# Patient Record
Sex: MALE | Race: WHITE | Employment: OTHER | URBAN - METROPOLITAN AREA
[De-identification: names, ages, dates, MRNs, and addresses within clinical notes are randomized per-mention and may not be internally consistent; named-entity substitution may affect disease eponyms.]

---

## 2017-09-05 ENCOUNTER — HOSPITAL ENCOUNTER (OUTPATIENT)
Dept: LAB | Age: 47
Discharge: HOME | End: 2017-09-05
Attending: FAMILY MEDICINE | Admitting: FAMILY MEDICINE

## 2017-09-05 LAB
ALBUMIN SERPL-MCNC: 4.1 GM/DL (ref 3.4–5)
ALP BLD-CCNC: 68 IU/L (ref 40–128)
ALT SERPL-CCNC: 17 U/L (ref 10–40)
ANION GAP SERPL CALCULATED.3IONS-SCNC: 9 MMOL/L (ref 4–16)
AST SERPL-CCNC: 16 IU/L (ref 15–37)
BACTERIA: NEGATIVE /HPF
BILIRUB SERPL-MCNC: 0.3 MG/DL (ref 0–1)
BILIRUBIN URINE: ABNORMAL MG/DL
BLOOD, URINE: NEGATIVE
BUN BLDV-MCNC: 25 MG/DL (ref 6–23)
CALCIUM SERPL-MCNC: 8.5 MG/DL (ref 8.3–10.6)
CHLORIDE BLD-SCNC: 103 MMOL/L (ref 99–110)
CHOLESTEROL: 188 MG/DL
CLARITY: CLEAR
CO2: 28 MMOL/L (ref 21–32)
COLOR: ABNORMAL
CREAT SERPL-MCNC: 1 MG/DL (ref 0.9–1.3)
ESTIMATED AVERAGE GLUCOSE: 97 MG/DL
GFR AFRICAN AMERICAN: >60 ML/MIN/1.73M2
GFR NON-AFRICAN AMERICAN: >60 ML/MIN/1.73M2
GLUCOSE FASTING: 90 MG/DL (ref 70–99)
GLUCOSE, URINE: NEGATIVE MG/DL
HBA1C MFR BLD: 5 % (ref 4.2–6.3)
HCT VFR BLD CALC: 44.8 % (ref 42–52)
HDLC SERPL-MCNC: 30 MG/DL
HEMOGLOBIN: 14.9 GM/DL (ref 13.5–18)
ICTOTEST: NEGATIVE
KETONES, URINE: ABNORMAL MG/DL
LDL CHOLESTEROL CALCULATED: 118 MG/DL
LEUKOCYTE ESTERASE, URINE: NEGATIVE
MCH RBC QN AUTO: 33.2 PG (ref 27–31)
MCHC RBC AUTO-ENTMCNC: 33.3 % (ref 32–36)
MCV RBC AUTO: 99.8 FL (ref 78–100)
MUCUS: ABNORMAL HPF
NITRITE URINE, QUANTITATIVE: NEGATIVE
PDW BLD-RTO: 12.7 % (ref 11.7–14.9)
PH, URINE: 7 (ref 5–8)
PLATELET # BLD: 165 K/CU MM (ref 140–440)
PMV BLD AUTO: 9.8 FL (ref 7.5–11.1)
POTASSIUM SERPL-SCNC: 5.1 MMOL/L (ref 3.5–5.1)
PROTEIN UA: 100 MG/DL
RBC # BLD: 4.49 M/CU MM (ref 4.6–6.2)
RBC URINE: ABNORMAL /HPF (ref 0–3)
SODIUM BLD-SCNC: 140 MMOL/L (ref 135–145)
SPECIFIC GRAVITY UA: 1.04 (ref 1–1.03)
TOTAL PROTEIN: 6.9 GM/DL (ref 6.4–8.2)
TRICHOMONAS: ABNORMAL /HPF
TRIGL SERPL-MCNC: 199 MG/DL
TSH HIGH SENSITIVITY: 1.73 UIU/ML (ref 0.27–4.2)
UROBILINOGEN, URINE: 2 MG/DL (ref 0.2–1)
WBC # BLD: 6.7 K/CU MM (ref 4–10.5)
WBC UA: <1 /HPF (ref 0–2)

## 2018-12-07 ENCOUNTER — HOSPITAL ENCOUNTER (OUTPATIENT)
Age: 48
Discharge: HOME | End: 2018-12-07
Payer: MEDICARE

## 2018-12-07 LAB
ALBUMIN SERPL-MCNC: 4.2 GM/DL (ref 3.4–5)
ALP BLD-CCNC: 67 IU/L (ref 40–128)
ALT SERPL-CCNC: 16 U/L (ref 10–40)
ANION GAP SERPL CALCULATED.3IONS-SCNC: 8 MMOL/L (ref 4–16)
AST SERPL-CCNC: 15 IU/L (ref 15–37)
BILIRUB SERPL-MCNC: 0.4 MG/DL (ref 0–1)
BUN BLDV-MCNC: 20 MG/DL (ref 6–23)
CALCIUM SERPL-MCNC: 9 MG/DL (ref 8.3–10.6)
CHLORIDE BLD-SCNC: 98 MMOL/L (ref 99–110)
CHOLESTEROL: 243 MG/DL
CO2: 33 MMOL/L (ref 21–32)
CREAT SERPL-MCNC: 1.1 MG/DL (ref 0.9–1.3)
ESTIMATED AVERAGE GLUCOSE: 103 MG/DL
GFR AFRICAN AMERICAN: >60 ML/MIN/1.73M2
GFR NON-AFRICAN AMERICAN: >60 ML/MIN/1.73M2
GLUCOSE BLD-MCNC: 98 MG/DL (ref 70–99)
HBA1C MFR BLD: 5.2 % (ref 4.2–6.3)
HCT VFR BLD CALC: 44.1 % (ref 42–52)
HDLC SERPL-MCNC: 29 MG/DL
HEMOGLOBIN: 14.8 GM/DL (ref 13.5–18)
LDL CHOLESTEROL DIRECT: 172 MG/DL
MCH RBC QN AUTO: 31.9 PG (ref 27–31)
MCHC RBC AUTO-ENTMCNC: 33.6 % (ref 32–36)
MCV RBC AUTO: 95 FL (ref 78–100)
PDW BLD-RTO: 12.7 % (ref 11.7–14.9)
PLATELET # BLD: 183 K/CU MM (ref 140–440)
PMV BLD AUTO: 10.1 FL (ref 7.5–11.1)
POTASSIUM SERPL-SCNC: 4.4 MMOL/L (ref 3.5–5.1)
RBC # BLD: 4.64 M/CU MM (ref 4.6–6.2)
SODIUM BLD-SCNC: 139 MMOL/L (ref 135–145)
T4 FREE: 1.1 NG/DL (ref 0.9–1.8)
TOTAL PROTEIN: 6.6 GM/DL (ref 6.4–8.2)
TRIGL SERPL-MCNC: 351 MG/DL
TSH HIGH SENSITIVITY: 2.7 UIU/ML (ref 0.27–4.2)
VALPROIC ACID LEVEL: 81.8 UG/ML (ref 50–100)
WBC # BLD: 5.7 K/CU MM (ref 4–10.5)

## 2018-12-07 PROCEDURE — 80053 COMPREHEN METABOLIC PANEL: CPT

## 2018-12-07 PROCEDURE — 83721 ASSAY OF BLOOD LIPOPROTEIN: CPT

## 2018-12-07 PROCEDURE — 80164 ASSAY DIPROPYLACETIC ACD TOT: CPT

## 2018-12-07 PROCEDURE — 84443 ASSAY THYROID STIM HORMONE: CPT

## 2018-12-07 PROCEDURE — 36415 COLL VENOUS BLD VENIPUNCTURE: CPT

## 2018-12-07 PROCEDURE — 84439 ASSAY OF FREE THYROXINE: CPT

## 2018-12-07 PROCEDURE — 85027 COMPLETE CBC AUTOMATED: CPT

## 2018-12-07 PROCEDURE — 80061 LIPID PANEL: CPT

## 2018-12-07 PROCEDURE — 83036 HEMOGLOBIN GLYCOSYLATED A1C: CPT

## 2019-03-22 ENCOUNTER — HOSPITAL ENCOUNTER (OUTPATIENT)
Age: 49
Discharge: HOME | End: 2019-03-22
Payer: MEDICARE

## 2019-03-22 ENCOUNTER — HOSPITAL ENCOUNTER (OUTPATIENT)
Dept: GENERAL RADIOLOGY | Age: 49
Discharge: HOME | End: 2019-03-22
Payer: MEDICARE

## 2019-03-22 ENCOUNTER — HOSPITAL ENCOUNTER (OUTPATIENT)
Dept: ULTRASOUND IMAGING | Age: 49
Discharge: HOME | End: 2019-03-22
Payer: MEDICARE

## 2019-03-22 DIAGNOSIS — M25.562 ACUTE PAIN OF LEFT KNEE: ICD-10-CM

## 2019-03-22 DIAGNOSIS — R60.0 LEG EDEMA: ICD-10-CM

## 2019-03-22 DIAGNOSIS — M25.562 LEFT KNEE PAIN, UNSPECIFIED CHRONICITY: ICD-10-CM

## 2019-03-22 PROCEDURE — 73560 X-RAY EXAM OF KNEE 1 OR 2: CPT

## 2019-03-22 PROCEDURE — 93971 EXTREMITY STUDY: CPT

## 2019-06-23 ENCOUNTER — HOSPITAL ENCOUNTER (INPATIENT)
Age: 49
LOS: 3 days | Discharge: HOME | DRG: 300 | End: 2019-06-26
Attending: EMERGENCY MEDICINE | Admitting: INTERNAL MEDICINE
Payer: MEDICARE

## 2019-06-23 ENCOUNTER — APPOINTMENT (OUTPATIENT)
Dept: ULTRASOUND IMAGING | Age: 49
DRG: 300 | End: 2019-06-23
Payer: MEDICARE

## 2019-06-23 ENCOUNTER — APPOINTMENT (OUTPATIENT)
Dept: GENERAL RADIOLOGY | Age: 49
DRG: 300 | End: 2019-06-23
Payer: MEDICARE

## 2019-06-23 DIAGNOSIS — M54.5 LOW BACK PAIN, UNSPECIFIED BACK PAIN LATERALITY, UNSPECIFIED CHRONICITY, WITH SCIATICA PRESENCE UNSPECIFIED: ICD-10-CM

## 2019-06-23 DIAGNOSIS — I82.412 ACUTE DEEP VEIN THROMBOSIS (DVT) OF FEMORAL VEIN OF LEFT LOWER EXTREMITY (HCC): Primary | ICD-10-CM

## 2019-06-23 DIAGNOSIS — I73.9 PAD (PERIPHERAL ARTERY DISEASE) (HCC): ICD-10-CM

## 2019-06-23 LAB
ALBUMIN SERPL-MCNC: 3.6 GM/DL (ref 3.4–5)
ALP BLD-CCNC: 71 IU/L (ref 40–129)
ALT SERPL-CCNC: 11 U/L (ref 10–40)
ANION GAP SERPL CALCULATED.3IONS-SCNC: 10 MMOL/L (ref 4–16)
APTT: 28.7 SECONDS (ref 21.2–33)
APTT: 29.5 SECONDS (ref 21.2–33)
AST SERPL-CCNC: 10 IU/L (ref 15–37)
BASOPHILS ABSOLUTE: 0.1 K/CU MM
BASOPHILS RELATIVE PERCENT: 1 % (ref 0–1)
BILIRUB SERPL-MCNC: 0.6 MG/DL (ref 0–1)
BUN BLDV-MCNC: 13 MG/DL (ref 6–23)
CALCIUM SERPL-MCNC: 8.9 MG/DL (ref 8.3–10.6)
CHLORIDE BLD-SCNC: 102 MMOL/L (ref 99–110)
CO2: 27 MMOL/L (ref 21–32)
CREAT SERPL-MCNC: 1 MG/DL (ref 0.9–1.3)
DIFFERENTIAL TYPE: ABNORMAL
EOSINOPHILS ABSOLUTE: 0.3 K/CU MM
EOSINOPHILS RELATIVE PERCENT: 3.4 % (ref 0–3)
GFR AFRICAN AMERICAN: >60 ML/MIN/1.73M2
GFR NON-AFRICAN AMERICAN: >60 ML/MIN/1.73M2
GLUCOSE BLD-MCNC: 92 MG/DL (ref 70–99)
HCT VFR BLD CALC: 39 % (ref 42–52)
HEMOGLOBIN: 13 GM/DL (ref 13.5–18)
IMMATURE NEUTROPHIL %: 0.4 % (ref 0–0.43)
INR BLD: 1.14 INDEX
LYMPHOCYTES ABSOLUTE: 1.5 K/CU MM
LYMPHOCYTES RELATIVE PERCENT: 18.7 % (ref 24–44)
MCH RBC QN AUTO: 31.8 PG (ref 27–31)
MCHC RBC AUTO-ENTMCNC: 33.3 % (ref 32–36)
MCV RBC AUTO: 95.4 FL (ref 78–100)
MONOCYTES ABSOLUTE: 0.7 K/CU MM
MONOCYTES RELATIVE PERCENT: 8.9 % (ref 0–4)
NUCLEATED RBC %: 0 %
PDW BLD-RTO: 12.4 % (ref 11.7–14.9)
PLATELET # BLD: 161 K/CU MM (ref 140–440)
PMV BLD AUTO: 9.3 FL (ref 7.5–11.1)
POTASSIUM SERPL-SCNC: 4.4 MMOL/L (ref 3.5–5.1)
PRO-BNP: 94.58 PG/ML
PROTHROMBIN TIME: 13.2 SECONDS (ref 9.12–12.5)
RBC # BLD: 4.09 M/CU MM (ref 4.6–6.2)
SEGMENTED NEUTROPHILS ABSOLUTE COUNT: 5.3 K/CU MM
SEGMENTED NEUTROPHILS RELATIVE PERCENT: 67.6 % (ref 36–66)
SODIUM BLD-SCNC: 139 MMOL/L (ref 135–145)
TOTAL CK: 71 IU/L (ref 38–174)
TOTAL IMMATURE NEUTOROPHIL: 0.03 K/CU MM
TOTAL NUCLEATED RBC: 0 K/CU MM
TOTAL PROTEIN: 7 GM/DL (ref 6.4–8.2)
WBC # BLD: 7.9 K/CU MM (ref 4–10.5)

## 2019-06-23 PROCEDURE — 6360000002 HC RX W HCPCS: Performed by: INTERNAL MEDICINE

## 2019-06-23 PROCEDURE — 85610 PROTHROMBIN TIME: CPT

## 2019-06-23 PROCEDURE — 36415 COLL VENOUS BLD VENIPUNCTURE: CPT

## 2019-06-23 PROCEDURE — 85730 THROMBOPLASTIN TIME PARTIAL: CPT

## 2019-06-23 PROCEDURE — 83880 ASSAY OF NATRIURETIC PEPTIDE: CPT

## 2019-06-23 PROCEDURE — 94761 N-INVAS EAR/PLS OXIMETRY MLT: CPT

## 2019-06-23 PROCEDURE — 93971 EXTREMITY STUDY: CPT

## 2019-06-23 PROCEDURE — 99284 EMERGENCY DEPT VISIT MOD MDM: CPT

## 2019-06-23 PROCEDURE — 6370000000 HC RX 637 (ALT 250 FOR IP): Performed by: INTERNAL MEDICINE

## 2019-06-23 PROCEDURE — 93005 ELECTROCARDIOGRAM TRACING: CPT | Performed by: PHYSICIAN ASSISTANT

## 2019-06-23 PROCEDURE — 80053 COMPREHEN METABOLIC PANEL: CPT

## 2019-06-23 PROCEDURE — 1200000000 HC SEMI PRIVATE

## 2019-06-23 PROCEDURE — 2580000003 HC RX 258: Performed by: INTERNAL MEDICINE

## 2019-06-23 PROCEDURE — 6360000002 HC RX W HCPCS: Performed by: PHYSICIAN ASSISTANT

## 2019-06-23 PROCEDURE — 6370000000 HC RX 637 (ALT 250 FOR IP): Performed by: PHYSICIAN ASSISTANT

## 2019-06-23 PROCEDURE — 82550 ASSAY OF CK (CPK): CPT

## 2019-06-23 PROCEDURE — 85025 COMPLETE CBC W/AUTO DIFF WBC: CPT

## 2019-06-23 PROCEDURE — 96372 THER/PROPH/DIAG INJ SC/IM: CPT

## 2019-06-23 PROCEDURE — 93010 ELECTROCARDIOGRAM REPORT: CPT | Performed by: INTERNAL MEDICINE

## 2019-06-23 PROCEDURE — 71045 X-RAY EXAM CHEST 1 VIEW: CPT

## 2019-06-23 PROCEDURE — 93926 LOWER EXTREMITY STUDY: CPT

## 2019-06-23 RX ORDER — OXYCODONE HYDROCHLORIDE 10 MG/1
10 TABLET ORAL
Status: DISCONTINUED | OUTPATIENT
Start: 2019-06-23 | End: 2019-06-23

## 2019-06-23 RX ORDER — HEPARIN SODIUM 5000 [USP'U]/ML
40 INJECTION, SOLUTION INTRAVENOUS; SUBCUTANEOUS PRN
Status: DISCONTINUED | OUTPATIENT
Start: 2019-06-23 | End: 2019-06-26

## 2019-06-23 RX ORDER — LISINOPRIL 10 MG/1
10 TABLET ORAL DAILY
COMMUNITY

## 2019-06-23 RX ORDER — OXYCODONE AND ACETAMINOPHEN 10; 325 MG/1; MG/1
1 TABLET ORAL
Status: DISCONTINUED | OUTPATIENT
Start: 2019-06-23 | End: 2019-06-23 | Stop reason: CLARIF

## 2019-06-23 RX ORDER — ONDANSETRON 4 MG/1
4 TABLET, ORALLY DISINTEGRATING ORAL EVERY 8 HOURS PRN
Status: DISCONTINUED | OUTPATIENT
Start: 2019-06-23 | End: 2019-06-26 | Stop reason: HOSPADM

## 2019-06-23 RX ORDER — HEPARIN SODIUM 1000 [USP'U]/ML
80 INJECTION, SOLUTION INTRAVENOUS; SUBCUTANEOUS PRN
Status: DISCONTINUED | OUTPATIENT
Start: 2019-06-23 | End: 2019-06-25

## 2019-06-23 RX ORDER — METHOCARBAMOL 750 MG/1
750 TABLET, FILM COATED ORAL 4 TIMES DAILY
Status: DISCONTINUED | OUTPATIENT
Start: 2019-06-23 | End: 2019-06-26 | Stop reason: HOSPADM

## 2019-06-23 RX ORDER — POLYETHYLENE GLYCOL 3350 17 G/17G
17 POWDER, FOR SOLUTION ORAL 3 TIMES DAILY PRN
Status: DISCONTINUED | OUTPATIENT
Start: 2019-06-23 | End: 2019-06-26 | Stop reason: HOSPADM

## 2019-06-23 RX ORDER — DIVALPROEX SODIUM 500 MG/1
500 TABLET, DELAYED RELEASE ORAL 4 TIMES DAILY
Status: DISCONTINUED | OUTPATIENT
Start: 2019-06-23 | End: 2019-06-23

## 2019-06-23 RX ORDER — MORPHINE SULFATE 4 MG/ML
4 INJECTION, SOLUTION INTRAMUSCULAR; INTRAVENOUS ONCE
Status: COMPLETED | OUTPATIENT
Start: 2019-06-23 | End: 2019-06-23

## 2019-06-23 RX ORDER — IPRATROPIUM BROMIDE AND ALBUTEROL SULFATE 2.5; .5 MG/3ML; MG/3ML
1 SOLUTION RESPIRATORY (INHALATION) EVERY 4 HOURS PRN
Status: DISCONTINUED | OUTPATIENT
Start: 2019-06-23 | End: 2019-06-26 | Stop reason: HOSPADM

## 2019-06-23 RX ORDER — OXYCODONE HYDROCHLORIDE AND ACETAMINOPHEN 5; 325 MG/1; MG/1
2 TABLET ORAL
Status: DISCONTINUED | OUTPATIENT
Start: 2019-06-23 | End: 2019-06-26 | Stop reason: HOSPADM

## 2019-06-23 RX ORDER — MORPHINE SULFATE 4 MG/ML
4 INJECTION, SOLUTION INTRAMUSCULAR; INTRAVENOUS EVERY 4 HOURS PRN
Status: DISCONTINUED | OUTPATIENT
Start: 2019-06-23 | End: 2019-06-26 | Stop reason: HOSPADM

## 2019-06-23 RX ORDER — LISINOPRIL 10 MG/1
10 TABLET ORAL DAILY
Status: DISCONTINUED | OUTPATIENT
Start: 2019-06-24 | End: 2019-06-26 | Stop reason: HOSPADM

## 2019-06-23 RX ORDER — PROCHLORPERAZINE EDISYLATE 5 MG/ML
10 INJECTION INTRAMUSCULAR; INTRAVENOUS EVERY 6 HOURS PRN
Status: DISCONTINUED | OUTPATIENT
Start: 2019-06-23 | End: 2019-06-26 | Stop reason: HOSPADM

## 2019-06-23 RX ORDER — MORPHINE SULFATE 15 MG/1
15 TABLET, FILM COATED, EXTENDED RELEASE ORAL EVERY 12 HOURS SCHEDULED
Status: DISCONTINUED | OUTPATIENT
Start: 2019-06-23 | End: 2019-06-26 | Stop reason: HOSPADM

## 2019-06-23 RX ORDER — ACETAMINOPHEN 325 MG/1
325 TABLET ORAL
Status: DISCONTINUED | OUTPATIENT
Start: 2019-06-23 | End: 2019-06-23

## 2019-06-23 RX ORDER — DOCUSATE SODIUM 100 MG/1
100 CAPSULE, LIQUID FILLED ORAL 2 TIMES DAILY
Status: DISCONTINUED | OUTPATIENT
Start: 2019-06-23 | End: 2019-06-26 | Stop reason: HOSPADM

## 2019-06-23 RX ORDER — SODIUM CHLORIDE 9 MG/ML
INJECTION, SOLUTION INTRAVENOUS CONTINUOUS
Status: DISCONTINUED | OUTPATIENT
Start: 2019-06-23 | End: 2019-06-26

## 2019-06-23 RX ORDER — SENNA PLUS 8.6 MG/1
2 TABLET ORAL 2 TIMES DAILY
Status: DISCONTINUED | OUTPATIENT
Start: 2019-06-23 | End: 2019-06-26 | Stop reason: HOSPADM

## 2019-06-23 RX ORDER — OXYCODONE HYDROCHLORIDE AND ACETAMINOPHEN 5; 325 MG/1; MG/1
1 TABLET ORAL ONCE
Status: COMPLETED | OUTPATIENT
Start: 2019-06-23 | End: 2019-06-23

## 2019-06-23 RX ORDER — MORPHINE SULFATE 4 MG/ML
4 INJECTION, SOLUTION INTRAMUSCULAR; INTRAVENOUS EVERY 30 MIN PRN
Status: DISCONTINUED | OUTPATIENT
Start: 2019-06-23 | End: 2019-06-26 | Stop reason: HOSPADM

## 2019-06-23 RX ORDER — HEPARIN SODIUM 1000 [USP'U]/ML
80 INJECTION, SOLUTION INTRAVENOUS; SUBCUTANEOUS ONCE
Status: COMPLETED | OUTPATIENT
Start: 2019-06-23 | End: 2019-06-23

## 2019-06-23 RX ORDER — TOPIRAMATE 25 MG/1
50 TABLET ORAL DAILY
Status: DISCONTINUED | OUTPATIENT
Start: 2019-06-24 | End: 2019-06-26 | Stop reason: HOSPADM

## 2019-06-23 RX ORDER — HEPARIN SODIUM 10000 [USP'U]/100ML
18 INJECTION, SOLUTION INTRAVENOUS CONTINUOUS
Status: DISCONTINUED | OUTPATIENT
Start: 2019-06-23 | End: 2019-06-26

## 2019-06-23 RX ORDER — CELECOXIB 200 MG/1
200 CAPSULE ORAL 2 TIMES DAILY
Status: DISCONTINUED | OUTPATIENT
Start: 2019-06-23 | End: 2019-06-26 | Stop reason: HOSPADM

## 2019-06-23 RX ORDER — ONDANSETRON 2 MG/ML
4 INJECTION INTRAMUSCULAR; INTRAVENOUS EVERY 6 HOURS PRN
Status: DISCONTINUED | OUTPATIENT
Start: 2019-06-23 | End: 2019-06-26 | Stop reason: HOSPADM

## 2019-06-23 RX ORDER — DIVALPROEX SODIUM 500 MG/1
1000 TABLET, EXTENDED RELEASE ORAL 2 TIMES DAILY
Status: DISCONTINUED | OUTPATIENT
Start: 2019-06-23 | End: 2019-06-26 | Stop reason: HOSPADM

## 2019-06-23 RX ADMIN — DIVALPROEX SODIUM 1000 MG: 500 TABLET, EXTENDED RELEASE ORAL at 22:10

## 2019-06-23 RX ADMIN — MORPHINE SULFATE 15 MG: 15 TABLET, EXTENDED RELEASE ORAL at 21:50

## 2019-06-23 RX ADMIN — OXYCODONE HYDROCHLORIDE AND ACETAMINOPHEN 1 TABLET: 5; 325 TABLET ORAL at 12:57

## 2019-06-23 RX ADMIN — HEPARIN SODIUM 8710 UNITS: 1000 INJECTION, SOLUTION INTRAVENOUS; SUBCUTANEOUS at 16:00

## 2019-06-23 RX ADMIN — MORPHINE SULFATE 4 MG: 4 INJECTION INTRAVENOUS at 12:57

## 2019-06-23 RX ADMIN — OXYCODONE HYDROCHLORIDE AND ACETAMINOPHEN 2 TABLET: 5; 325 TABLET ORAL at 19:58

## 2019-06-23 RX ADMIN — MORPHINE SULFATE 4 MG: 4 INJECTION, SOLUTION INTRAMUSCULAR; INTRAVENOUS at 18:26

## 2019-06-23 RX ADMIN — CELECOXIB 200 MG: 200 CAPSULE ORAL at 20:46

## 2019-06-23 RX ADMIN — OXYCODONE HYDROCHLORIDE AND ACETAMINOPHEN 1 TABLET: 5; 325 TABLET ORAL at 16:00

## 2019-06-23 RX ADMIN — HEPARIN SODIUM AND DEXTROSE 18 UNITS/KG/HR: 10000; 5 INJECTION INTRAVENOUS at 16:00

## 2019-06-23 RX ADMIN — DOCUSATE SODIUM 100 MG: 100 CAPSULE, LIQUID FILLED ORAL at 20:46

## 2019-06-23 RX ADMIN — SODIUM CHLORIDE: 9 INJECTION, SOLUTION INTRAVENOUS at 18:27

## 2019-06-23 RX ADMIN — METHOCARBAMOL TABLETS 750 MG: 750 TABLET, COATED ORAL at 20:46

## 2019-06-23 ASSESSMENT — PAIN SCALES - GENERAL
PAINLEVEL_OUTOF10: 6
PAINLEVEL_OUTOF10: 7
PAINLEVEL_OUTOF10: 6
PAINLEVEL_OUTOF10: 8
PAINLEVEL_OUTOF10: 6
PAINLEVEL_OUTOF10: 8
PAINLEVEL_OUTOF10: 7
PAINLEVEL_OUTOF10: 5
PAINLEVEL_OUTOF10: 7
PAINLEVEL_OUTOF10: 6

## 2019-06-23 ASSESSMENT — PAIN DESCRIPTION - ORIENTATION
ORIENTATION: LEFT

## 2019-06-23 ASSESSMENT — PAIN DESCRIPTION - FREQUENCY
FREQUENCY: INTERMITTENT
FREQUENCY: CONTINUOUS
FREQUENCY: INTERMITTENT

## 2019-06-23 ASSESSMENT — PAIN DESCRIPTION - LOCATION
LOCATION: ANKLE
LOCATION: LEG
LOCATION: ANKLE

## 2019-06-23 ASSESSMENT — PAIN DESCRIPTION - DESCRIPTORS
DESCRIPTORS: SHARP
DESCRIPTORS: THROBBING
DESCRIPTORS: THROBBING

## 2019-06-23 ASSESSMENT — PAIN DESCRIPTION - PROGRESSION: CLINICAL_PROGRESSION: NOT CHANGED

## 2019-06-23 ASSESSMENT — PAIN DESCRIPTION - ONSET: ONSET: ON-GOING

## 2019-06-23 ASSESSMENT — PAIN DESCRIPTION - PAIN TYPE
TYPE: ACUTE PAIN

## 2019-06-23 NOTE — ED TRIAGE NOTES
Pt presents to the ER with c/o left leg swelling that has increased over the past 3 months. Pt states he fell 3 months ago and the pain, swelling and discoloration has increased since. Pt rates pain 8/10. Pt has a hx of blood clots.   Is not currently on anti-coagulants,

## 2019-06-23 NOTE — ED PROVIDER NOTES
EMERGENCY DEPARTMENT ENCOUNTER      PCP: Carlen Severs, [facility]    Chief Complaint   Patient presents with    Leg Swelling     left leg, fell about 3 months ago and has had issues since         This patient was evaluated by the attending physician. Dr. Donna Fierro. HPI    Molly Cote is a 54 y.o. male who presents to the emergency department today chief complaint of increasing left leg pain, swelling. Patient states that he was involved in an injury approximately 3 weeks ago. Since then he has had progressive left leg swelling. Has been seen by primary care, had outpatient venous ultrasound testing which demonstrated no underlying clots, was being sent to a vein specialist upcoming week. He states that his pain and swelling has progressed into today. Patient is also had history of peripheral artery disease, has needed stents in the past.  He denies any significant coolness, paresthesias. REVIEW OF SYSTEMS    General: Denies constitutional symptoms. Cardiac: Denies chest wall injury or chest pain  Pulmonary: Denies chest wall injury or shortness of breath  GI: Denies abdomen injury or abdominal pain  Musculoskeletal:  Denies any other musculoskeletal pain or injuries, including chest wall and back. Skin:  Skin intact. Lymphatics:  No nodules or streaks. See HPI and nursing notes for additional information     PAST MEDICAL & SURGICAL HISTORY    Past Medical History:   Diagnosis Date    Seizure disorder ([address]     Controlled by medication    Seizures ([address]      Past Surgical History:   Procedure Laterality Date    BACK SURGERY  2013 16-18 vertebrae fusion-from T5-S2    BACK SURGERY  1986?     SHOULDER ARTHROPLASTY Left        CURRENT MEDICATIONS        ALLERGIES    No Known Allergies    SOCIAL & FAMILY HISTORY    Social History     Socioeconomic History    Marital status:      Spouse name: None    Number of children: None    Years of education: None    Highest education level: None   Occupational History    None   Social Needs    Financial resource strain: None    Food insecurity:     Worry: None     Inability: None    Transportation needs:     Medical: None     Non-medical: None   Tobacco Use    Smoking status: Former Smoker    Smokeless tobacco: Never Used   Substance and Sexual Activity    Alcohol use: No    Drug use: No    Sexual activity: None   Lifestyle    Physical activity:     Days per week: None     Minutes per session: None    Stress: None   Relationships    Social connections:     Talks on phone: None     Gets together: None     Attends Hoahaoism service: None     Active member of club or organization: None     Attends meetings of clubs or organizations: None     Relationship status: None    Intimate partner violence:     Fear of current or ex partner: None     Emotionally abused: None     Physically abused: None     Forced sexual activity: None   Other Topics Concern    None   Social History Narrative    None     Family History   Problem Relation Age of Onset    Cancer Other     Tuberculosis Other     Diabetes Other     Heart Disease Other     High Blood Pressure Other     Stroke Other        PHYSICAL EXAM    VITAL SIGNS: BP (!) 127/91   Pulse 97   Temp 98.2 °F (36.8 °C) (Oral)   Resp 14   Ht 5' 11\" (1.803 m)   Wt 240 lb (108.9 kg)   SpO2 99%   BMI 33.47 kg/m²   Constitutional:  Well developed, well nourished, no acute distress, non-toxic appearance   HENT:  Atraumatic  Cardiovascular. Regular rate and rhythm, no murmurs rubs or gallops  Abdomen: Soft, nontender  Musculoskeletal:   There is obvious swelling, mild erythema to the left lower extremity. No obvious signs of bruising. Has faint distal pulses, delayed cap refill. Positive Homans sign, t tender calf to palpation. No significant bony tenderness. Examination of remaining extremities reveals active ROM of  joints without ligamentous laxity.  Theres no obvious <300 PG/ML   PT - INR   Result Value Ref Range    Protime 13.2 (H) 9.12 - 12.5 SECONDS    INR 1.14 INDEX   PTT   Result Value Ref Range    aPTT 29.5 21.2 - 33.0 SECONDS   CK   Result Value Ref Range    Total CK 71 38 - 174 IU/L   APTT   Result Value Ref Range    aPTT 28.7 21.2 - 33.0 SECONDS   EKG 12 Lead   Result Value Ref Range    Ventricular Rate 93 BPM    Atrial Rate 93 BPM    P-R Interval 150 ms    QRS Duration 90 ms    Q-T Interval 362 ms    QTc Calculation (Bazett) 450 ms    P Axis 58 degrees    R Axis 22 degrees    T Axis 6 degrees    Diagnosis       Normal sinus rhythm  Inferior infarct , age undetermined  Abnormal ECG  No previous ECGs available  Confirmed by [facility] NOVAK ([id number]) on 6/23/2019 6:29:57 PM         RADIOLOGY/PROCEDURES    Vl Dup Lower Extremity Arteries Left    Result Date: 6/23/2019  EXAMINATION: ARTERIAL DUPLEX ULTRASOUND OF THE  LOWER EXTREMITY  6/23/2019 1:23 pm TECHNIQUE: Duplex ultrasound and Doppler images were obtained of the lower extremity. COMPARISON: None. HISTORY: ORDERING SYSTEM PROVIDED HISTORY: left leg pain, history of PAD TECHNOLOGIST PROVIDED HISTORY: Reason for exam:->left leg pain, history of PAD Acuity: Acute Type of Exam: Initial Relevant Medical/Surgical History: fell x 3 mos ago, worsening pain and discoloration, no anti coags h/o dvt FINDINGS: Flow velocities were measured as follows: Com. Fem. 96 cm/sec Prof.            65 cm/sec SFA Prox. 97 cm/sec SFA Mid.      86 cm/sec SFA Dist.      67 cm/sec Pop.             73 cm/sec PTA             nonvisualized Peron. Nonvisualized LUIS EDUARDO             14 cm/sec The Doppler derived arterial velocity waveform of the left common femoral artery, superficial femoral artery, and popliteal artery are all triphasic. Left anterior tibial artery waveform appears dampened and monophasic. Normal waveforms through the popliteal artery.  Posterior tibial artery and peroneal artery are nonvisualized and presumed to be The peroneal vein and posterior tibial vein are not visualized. The soft tissues are edematous. 1. Deep venous thrombus involving the common femoral vein, femoral vein, and popliteal vein on the left which is occlusive within the femoral and popliteal veins. Findings were discussed with WILLIE Gallagher at 3:25 pm on 6/23/2019. ED COURSE & MEDICAL DECISION MAKING      Patient presents as above. Emergency etiologies considered. Patient initial vital signs are stable. He had progressive left leg pain and swelling. Patient does have a history of peripheral artery disease. He does give history of remote trauma as well. Has had outpatient ultrasounds that were negative. Essentially stating his pain is not been well controlled. A work-up was initiated. His labs were reassuring. Ultrasound is showing significant DVT to the common femoral, femoral and popliteal veins. The arterial aspect of the ultrasound is also demonstrating possible occlusion into the popliteal artery. We did consult cardiovascular surgery, Dr. Luisito Portillo will see the patient but will advise treatment for the acute DVT. We initiated the patient on heparin. He was admitted to the hospitalist unit for further treatment of this large clot burden as well as the possible peripheral arterial components. Patient was staffed with Krystyna Esquivel. .    The patient and/or the family were informed of the results of any tests/labs/imaging, the treatment plan, and time was allotted to answer questions. Clinical  IMPRESSION    1. Acute deep vein thrombosis (DVT) of femoral vein of left lower extremity (HCC)    2. PAD (peripheral artery disease) ([facility] [facility]      Comment: Please note this report has been produced using speech recognition software and may contain errors related to that system including errors in grammar, punctuation, and spelling, as well as words and phrases that may be inappropriate.  If there are any questions or concerns

## 2019-06-23 NOTE — ED NOTES
Per [facility] medicate pt with both Morphine and Percocet order.       Tee Sifuentes RN  06/23/19 [id number]

## 2019-06-23 NOTE — H&P
HISTORY AND PHYSICAL  (Hospitalist, Internal Medicine)  IDENTIFYING INFORMATION   PATIENT:  Britton Freeman  MRN:  [id number]  ADMIT DATE: 6/23/2019  TIME OF EVALUATION: 6/23/2019 4:56 PM    CHIEF COMPLAINT   LLE pain, swelling    HISTORY OF PRESENT ILLNESS   Britton Freeman is a 54 y.o. male with a past medical history of chronic back pain s/p spinal fusion/screws/rods placement 5-6 years ago at [facility] whose post-op course was c/b left proximal venous damage c/b DVT s/p stent placement and treated with 6 months of short term AC who presents with acute extensive LLE DVT. He reports of injuring left knee 3 months ago when he had fallen after tripping on the side walk with immediate pain and swelling behind the left knee which resolved with time. However, in the past 3-4 months, he reported progressive swelling of the left leg from thigh down to the distal leg associated with pain along the back of his left knee and leg. Described 8/10 with improvement to 6/10 with opiates. Pain radiates regionally around the area. Denies other risk factors other than the ones described above. PMH listed below:    PAST MEDICAL, SURGICAL, FAMILY, and SOCIAL HISTORY     Past Medical History:   Diagnosis Date    Seizure disorder ([address]     Controlled by medication    Seizures ([address]      Past Surgical History:   Procedure Laterality Date    BACK SURGERY  2013 16-18 vertebrae fusion-from T5-S2    BACK SURGERY  1986?     SHOULDER ARTHROPLASTY Left      Family History   Problem Relation Age of Onset    Cancer Other     Tuberculosis Other     Diabetes Other     Heart Disease Other     High Blood Pressure Other     Stroke Other      Family Hx of HTN  Family Hx as reviewed above, otherwise non-contributory  Social History     Socioeconomic History    Marital status:      Spouse name: None    Number of children: None    Years of education: None    Highest education level: None   Occupational History    None   Social mg by mouth daily       methocarbamol (ROBAXIN) 750 MG tablet Take 750 mg by mouth 4 times daily.  divalproex (DEPAKOTE) 500 MG DR tablet Take 500 mg by mouth 4 times daily.  celecoxib (CELEBREX) 200 MG capsule Take 200 mg by mouth 2 times daily.  oxyCODONE-acetaminophen (PERCOCET)  MG per tablet Take 2 tablets by mouth 2 times daily            Allergies  No Known Allergies    REVIEW OF SYSTEMS   Within above limitations. 14 point review of systems reviewed. Pertinent positive or negative as per HPI or otherwise negative per 14 point systems review. PHYSICAL EXAM     Wt Readings from Last 3 Encounters:   06/23/19 240 lb (108.9 kg)   12/04/15 240 lb (108.9 kg)   11/30/15 240 lb (108.9 kg)       Blood pressure 121/88, pulse 89, temperature 98.2 °F (36.8 °C), temperature source Oral, resp. rate 10, height 5' 11\" (1.803 m), weight 240 lb (108.9 kg), SpO2 99 %. General - AAO x 3  Psych - Appropriate affect/speech. No agitation  Eyes - Eye lids intact. No scleral icterus  ENT - Lips wnl. External ear clear/dry/intact. No thyromegaly on inspection  Neuro - No gross peripheral or central neuro deficits on inspection  Heart - Sinus. RRR. S1 and S2 present. No elevated JVD appreciated  Lung - Adequate air entry b/l, No crackles/wheezes appreciated  GI - Abdominal scar. Soft. No guarding/rigidity. No hepatosplenomegaly/ascites.  BS+   - No CVA/suprapubic tenderness or palpable bladder distension  Skin - LLE swelling of the entire leg leg with local pain    LABS AND IMAGING   CBC  [unfilled]    Last 3 Hemoglobin  Lab Results   Component Value Date    HGB 13.0 06/23/2019    HGB 14.8 12/07/2018    HGB 14.9 09/05/2017     Last 3 WBC/ANC  Lab Results   Component Value Date    WBC 7.9 06/23/2019    WBC 5.7 12/07/2018    WBC 6.7 09/05/2017     No components found for: GRNLOCTYABS  Last 3 Platelets  No results found for: PLATELET  Chemistry  [unfilled]  [unfilled]  Lab Results   Component Value 06/23/19 1518     Order Status: Completed Updated: 06/23/19 1523     Narrative:       EXAMINATION:  ARTERIAL DUPLEX ULTRASOUND OF THE  LOWER EXTREMITY  6/23/2019 1:23 pm    TECHNIQUE:  Duplex ultrasound and Doppler images were obtained of the lower extremity. COMPARISON:  None. HISTORY:  ORDERING SYSTEM PROVIDED HISTORY: left leg pain, history of PAD  TECHNOLOGIST PROVIDED HISTORY:  Reason for exam:->left leg pain, history of PAD  Acuity: Acute  Type of Exam: Initial  Relevant Medical/Surgical History: fell x 3 mos ago, worsening pain and  discoloration, no anti coags h/o dvt    FINDINGS:  Flow velocities were measured as follows:        Com. Fem.   96 cm/sec    Prof.            65 cm/sec    SFA Prox.     97 cm/sec    SFA Mid.      86 cm/sec    SFA Dist.      67 cm/sec    Pop.             73 cm/sec    PTA             nonvisualized    Peron.          Nonvisualized    LUIS EDUARDO             14 cm/sec    The Doppler derived arterial velocity waveform of the left common femoral  artery, superficial femoral artery, and popliteal artery are all triphasic. Left anterior tibial artery waveform appears dampened and monophasic.     Impression:       Normal waveforms through the popliteal artery. Posterior tibial artery and peroneal artery are nonvisualized and presumed to  be occluded. Dampened monophasic waveform of the left anterior tibial artery suggesting  significantly diminished flow.     XR CHEST PORTABLE [[id number]] Collected: 06/23/19 1404     Order Status: Completed Updated: 06/23/19 1410     Narrative:       EXAMINATION:  ONE XRAY VIEW OF THE CHEST    6/23/2019 1:03 pm    COMPARISON:  None.     HISTORY:  ORDERING SYSTEM PROVIDED HISTORY: leg swelling  TECHNOLOGIST PROVIDED HISTORY:  Reason for exam:->leg swelling  Ordering Physician Provided Reason for Exam: leg swelling  Acuity: Acute  Type of Exam: Initial  Additional signs and symptoms: Pt presents to the ER with c/o left leg  swelling that has increased over the past 3 months.  Pt states he fell 3  months ago and the pain, swelling and discoloration has increased since.  Pt  rates pain 8/10.  Pt has a hx of blood clots    FINDINGS:  Limited due to low lung volumes.  Normal heart size.  Prominent  bronchovascular markings probably due to low lung volumes.  No focal  consolidation, pleural effusion, or pneumothorax.  Posterior fusion hardware  involving the thoracic spine.     Impression:       See findings above.        EKG personally reviewed with rate 93, NSR      Relevant labs and imaging reviewed    ASSESSMENT AND PLAN       Extensive acute LLE DVT after recent trauma 3 months ago  Hx of venous injury of LLE while undergoing spinal surgery with reports of stent in-situ with prior DVT s/p 6 months of short term AC 5-6 year ago sandi-operatively  - heparin gtt, trend APTT  - consult heme and vascular to evaluate candidacy for EKOS given extensive and symptomatic disease  - trend labs    Incidental diminished flow of left ant tibial artery on doppler US but w/o clinical evidence of acute limb ischemia  - ED d/w vascular surgery to assist      Chronic back pain s/p rods/screw/spinal fusion  - on large doses of opiates    HTN  Seizure disorder    Case d/w ED physician    [facility], Internal Medicine  6/23/2019 at 4:56 PM

## 2019-06-23 NOTE — ED PROVIDER NOTES
I independently examined and evaluated [address]. In brief, 49yom with complaint of leg swelling, increasing over months. Had injury 3 months ago with fall. Having swelling and discoloration since that time. Has a h/o blood clots, not on blood thinners. No CP or SOB. No numbness/weakness. Does have pain in the leg. He reports had an US that was negative on Friday. Reports they looked for clot and at his arteries. Focused exam revealed male in no acute distress, RRR, nonlabored respirations, abdomen soft and nondistended, left leg with significant swelling, no bruising. .    ED course: Patient's pulses were dopplerable, he does have a large amount of swelling and so ultrasound to evaluate veins and arteries were as ordered and labs was ordered. Found to have extensive DVT, also found to have what appeared to be some flow limitation in the popliteal artery, questionable if this is related to the amount of swelling and DVT that he has and would require stent versus if this is all old is he does have a history of peripheral artery disease. We did consult vascular surgery who will follow him but at this time does not need emergent intervention as he has pulses and sensation, no significant pain or crepitus. He will require blood thinners, admission given how large his DVT and how proximal it is. He is agreeable to plan    All diagnostic, treatment, and disposition decisions were made by myself in conjunction with the advanced practice provider. For all further details of the patient's emergency department visit, please see the advanced practice provider's documentation. Comment: Please note this report has been produced using speech recognition software and may contain errors related to that system including errors in grammar, punctuation, and spelling, as well as words and phrases that may be inappropriate.  If there are any questions or concerns please feel free to contact the dictating provider for clarification.         Luigi Horowitz MD  06/23/19 [id number]

## 2019-06-23 NOTE — ED NOTES
CCM and CPOX placed. Pt reports increasing swelling, redness and pain to LLE ongoing for 3 months. Pt denies CP or SOB. Pt denies n/t. PMS intact. Redness and warmth noted to LLE. Pt updated on admission and in agreement. Call light in reach. Will cont. To monitor.       Kavitha Bedoya RN  06/23/19 0816

## 2019-06-24 ENCOUNTER — APPOINTMENT (OUTPATIENT)
Dept: GENERAL RADIOLOGY | Age: 49
DRG: 300 | End: 2019-06-24
Payer: MEDICARE

## 2019-06-24 LAB
ANION GAP SERPL CALCULATED.3IONS-SCNC: 10 MMOL/L (ref 4–16)
APTT: 47.1 SECONDS (ref 21.2–33)
APTT: 52.2 SECONDS (ref 21.2–33)
APTT: 52.9 SECONDS (ref 21.2–33)
APTT: 60.2 SECONDS (ref 21.2–33)
APTT: 69.3 SECONDS (ref 21.2–33)
BASOPHILS ABSOLUTE: 0.1 K/CU MM
BASOPHILS RELATIVE PERCENT: 1.1 % (ref 0–1)
BUN BLDV-MCNC: 12 MG/DL (ref 6–23)
CALCIUM SERPL-MCNC: 8.4 MG/DL (ref 8.3–10.6)
CHLORIDE BLD-SCNC: 106 MMOL/L (ref 99–110)
CO2: 27 MMOL/L (ref 21–32)
CREAT SERPL-MCNC: 0.8 MG/DL (ref 0.9–1.3)
DIFFERENTIAL TYPE: ABNORMAL
EOSINOPHILS ABSOLUTE: 0.4 K/CU MM
EOSINOPHILS RELATIVE PERCENT: 6.6 % (ref 0–3)
GFR AFRICAN AMERICAN: >60 ML/MIN/1.73M2
GFR NON-AFRICAN AMERICAN: >60 ML/MIN/1.73M2
GLUCOSE BLD-MCNC: 117 MG/DL (ref 70–99)
HCT VFR BLD CALC: 36 % (ref 42–52)
HEMOGLOBIN: 11.8 GM/DL (ref 13.5–18)
IMMATURE NEUTROPHIL %: 0.4 % (ref 0–0.43)
INR BLD: 1.07 INDEX
LYMPHOCYTES ABSOLUTE: 1.7 K/CU MM
LYMPHOCYTES RELATIVE PERCENT: 32 % (ref 24–44)
MCH RBC QN AUTO: 31.3 PG (ref 27–31)
MCHC RBC AUTO-ENTMCNC: 32.8 % (ref 32–36)
MCV RBC AUTO: 95.5 FL (ref 78–100)
MONOCYTES ABSOLUTE: 0.5 K/CU MM
MONOCYTES RELATIVE PERCENT: 8.7 % (ref 0–4)
NUCLEATED RBC %: 0 %
PDW BLD-RTO: 12.5 % (ref 11.7–14.9)
PLATELET # BLD: 149 K/CU MM (ref 140–440)
PMV BLD AUTO: 9.5 FL (ref 7.5–11.1)
POTASSIUM SERPL-SCNC: 4.3 MMOL/L (ref 3.5–5.1)
PROTHROMBIN TIME: 12.4 SECONDS (ref 9.12–12.5)
RBC # BLD: 3.77 M/CU MM (ref 4.6–6.2)
SEGMENTED NEUTROPHILS ABSOLUTE COUNT: 2.8 K/CU MM
SEGMENTED NEUTROPHILS RELATIVE PERCENT: 51.2 % (ref 36–66)
SODIUM BLD-SCNC: 143 MMOL/L (ref 135–145)
TOTAL IMMATURE NEUTOROPHIL: 0.02 K/CU MM
TOTAL NUCLEATED RBC: 0 K/CU MM
WBC # BLD: 5.4 K/CU MM (ref 4–10.5)

## 2019-06-24 PROCEDURE — 6370000000 HC RX 637 (ALT 250 FOR IP): Performed by: INTERNAL MEDICINE

## 2019-06-24 PROCEDURE — 36415 COLL VENOUS BLD VENIPUNCTURE: CPT

## 2019-06-24 PROCEDURE — 80048 BASIC METABOLIC PNL TOTAL CA: CPT

## 2019-06-24 PROCEDURE — 6360000002 HC RX W HCPCS: Performed by: INTERNAL MEDICINE

## 2019-06-24 PROCEDURE — 85025 COMPLETE CBC W/AUTO DIFF WBC: CPT

## 2019-06-24 PROCEDURE — 74018 RADEX ABDOMEN 1 VIEW: CPT

## 2019-06-24 PROCEDURE — 85730 THROMBOPLASTIN TIME PARTIAL: CPT

## 2019-06-24 PROCEDURE — 2580000003 HC RX 258: Performed by: INTERNAL MEDICINE

## 2019-06-24 PROCEDURE — 85610 PROTHROMBIN TIME: CPT

## 2019-06-24 PROCEDURE — 1200000000 HC SEMI PRIVATE

## 2019-06-24 RX ORDER — BISACODYL 10 MG
10 SUPPOSITORY, RECTAL RECTAL ONCE
Status: DISCONTINUED | OUTPATIENT
Start: 2019-06-24 | End: 2019-06-26 | Stop reason: HOSPADM

## 2019-06-24 RX ORDER — POLYETHYLENE GLYCOL 3350 17 G/17G
34 POWDER, FOR SOLUTION ORAL DAILY
Status: DISCONTINUED | OUTPATIENT
Start: 2019-06-24 | End: 2019-06-26 | Stop reason: HOSPADM

## 2019-06-24 RX ADMIN — MORPHINE SULFATE 15 MG: 15 TABLET, EXTENDED RELEASE ORAL at 21:52

## 2019-06-24 RX ADMIN — DOCUSATE SODIUM 100 MG: 100 CAPSULE, LIQUID FILLED ORAL at 09:11

## 2019-06-24 RX ADMIN — OXYCODONE HYDROCHLORIDE AND ACETAMINOPHEN 2 TABLET: 5; 325 TABLET ORAL at 23:44

## 2019-06-24 RX ADMIN — HEPARIN SODIUM AND DEXTROSE 20.02 UNITS/KG/HR: 10000; 5 INJECTION INTRAVENOUS at 04:58

## 2019-06-24 RX ADMIN — MORPHINE SULFATE 4 MG: 4 INJECTION, SOLUTION INTRAMUSCULAR; INTRAVENOUS at 08:12

## 2019-06-24 RX ADMIN — CELECOXIB 200 MG: 200 CAPSULE ORAL at 21:50

## 2019-06-24 RX ADMIN — METHOCARBAMOL TABLETS 750 MG: 750 TABLET, COATED ORAL at 17:06

## 2019-06-24 RX ADMIN — OXYCODONE HYDROCHLORIDE AND ACETAMINOPHEN 2 TABLET: 5; 325 TABLET ORAL at 21:52

## 2019-06-24 RX ADMIN — MORPHINE SULFATE 15 MG: 15 TABLET, EXTENDED RELEASE ORAL at 09:11

## 2019-06-24 RX ADMIN — METHOCARBAMOL TABLETS 750 MG: 750 TABLET, COATED ORAL at 21:52

## 2019-06-24 RX ADMIN — TOPIRAMATE 50 MG: 25 TABLET, FILM COATED ORAL at 09:11

## 2019-06-24 RX ADMIN — HEPARIN SODIUM AND DEXTROSE 22.02 UNITS/KG/HR: 10000; 5 INJECTION INTRAVENOUS at 05:56

## 2019-06-24 RX ADMIN — METHOCARBAMOL TABLETS 750 MG: 750 TABLET, COATED ORAL at 09:12

## 2019-06-24 RX ADMIN — SODIUM CHLORIDE: 9 INJECTION, SOLUTION INTRAVENOUS at 04:16

## 2019-06-24 RX ADMIN — HEPARIN SODIUM AND DEXTROSE 26.02 UNITS/KG/HR: 10000; 5 INJECTION INTRAVENOUS at 15:01

## 2019-06-24 RX ADMIN — SODIUM CHLORIDE: 9 INJECTION, SOLUTION INTRAVENOUS at 13:04

## 2019-06-24 RX ADMIN — METHOCARBAMOL TABLETS 750 MG: 750 TABLET, COATED ORAL at 12:56

## 2019-06-24 RX ADMIN — OXYCODONE HYDROCHLORIDE AND ACETAMINOPHEN 2 TABLET: 5; 325 TABLET ORAL at 09:11

## 2019-06-24 RX ADMIN — SENNOSIDES 17.2 MG: 8.6 TABLET, FILM COATED ORAL at 09:11

## 2019-06-24 RX ADMIN — MORPHINE SULFATE 4 MG: 4 INJECTION, SOLUTION INTRAMUSCULAR; INTRAVENOUS at 17:06

## 2019-06-24 RX ADMIN — OXYCODONE HYDROCHLORIDE AND ACETAMINOPHEN 2 TABLET: 5; 325 TABLET ORAL at 17:06

## 2019-06-24 RX ADMIN — DIVALPROEX SODIUM 1000 MG: 500 TABLET, EXTENDED RELEASE ORAL at 09:52

## 2019-06-24 RX ADMIN — MORPHINE SULFATE 4 MG: 4 INJECTION, SOLUTION INTRAMUSCULAR; INTRAVENOUS at 23:44

## 2019-06-24 RX ADMIN — LISINOPRIL 10 MG: 10 TABLET ORAL at 09:11

## 2019-06-24 RX ADMIN — OXYCODONE HYDROCHLORIDE AND ACETAMINOPHEN 2 TABLET: 5; 325 TABLET ORAL at 05:13

## 2019-06-24 RX ADMIN — OXYCODONE HYDROCHLORIDE AND ACETAMINOPHEN 2 TABLET: 5; 325 TABLET ORAL at 12:55

## 2019-06-24 RX ADMIN — DIVALPROEX SODIUM 1000 MG: 500 TABLET, EXTENDED RELEASE ORAL at 21:52

## 2019-06-24 RX ADMIN — CELECOXIB 200 MG: 200 CAPSULE ORAL at 09:11

## 2019-06-24 RX ADMIN — MORPHINE SULFATE 4 MG: 4 INJECTION, SOLUTION INTRAMUSCULAR; INTRAVENOUS at 12:58

## 2019-06-24 RX ADMIN — MORPHINE SULFATE 4 MG: 4 INJECTION, SOLUTION INTRAMUSCULAR; INTRAVENOUS at 04:04

## 2019-06-24 ASSESSMENT — PAIN SCALES - GENERAL
PAINLEVEL_OUTOF10: 7
PAINLEVEL_OUTOF10: 7
PAINLEVEL_OUTOF10: 5
PAINLEVEL_OUTOF10: 7
PAINLEVEL_OUTOF10: 7
PAINLEVEL_OUTOF10: 6
PAINLEVEL_OUTOF10: 6
PAINLEVEL_OUTOF10: 7
PAINLEVEL_OUTOF10: 7
PAINLEVEL_OUTOF10: 5
PAINLEVEL_OUTOF10: 5

## 2019-06-24 ASSESSMENT — PAIN DESCRIPTION - LOCATION
LOCATION: BACK;LEG
LOCATION: LEG
LOCATION: LEG
LOCATION: BACK;LEG
LOCATION: BACK;LEG
LOCATION: LEG
LOCATION: LEG

## 2019-06-24 ASSESSMENT — PAIN DESCRIPTION - DESCRIPTORS
DESCRIPTORS: ACHING;CONSTANT;THROBBING
DESCRIPTORS: OTHER (COMMENT)
DESCRIPTORS: THROBBING
DESCRIPTORS: POUNDING;THROBBING
DESCRIPTORS: THROBBING
DESCRIPTORS: THROBBING

## 2019-06-24 ASSESSMENT — PAIN DESCRIPTION - ONSET
ONSET: ON-GOING

## 2019-06-24 ASSESSMENT — PAIN DESCRIPTION - PAIN TYPE
TYPE: CHRONIC PAIN;ACUTE PAIN
TYPE: CHRONIC PAIN;ACUTE PAIN
TYPE: CHRONIC PAIN
TYPE: ACUTE PAIN
TYPE: CHRONIC PAIN;ACUTE PAIN
TYPE: CHRONIC PAIN
TYPE: ACUTE PAIN
TYPE: ACUTE PAIN

## 2019-06-24 ASSESSMENT — PAIN DESCRIPTION - PROGRESSION
CLINICAL_PROGRESSION: NOT CHANGED

## 2019-06-24 ASSESSMENT — PAIN DESCRIPTION - FREQUENCY
FREQUENCY: CONTINUOUS
FREQUENCY: INTERMITTENT
FREQUENCY: INTERMITTENT
FREQUENCY: CONTINUOUS

## 2019-06-24 ASSESSMENT — PAIN DESCRIPTION - ORIENTATION
ORIENTATION: LEFT
ORIENTATION: UPPER
ORIENTATION: LEFT
ORIENTATION: UPPER
ORIENTATION: LEFT

## 2019-06-24 ASSESSMENT — PAIN - FUNCTIONAL ASSESSMENT
PAIN_FUNCTIONAL_ASSESSMENT: PREVENTS OR INTERFERES SOME ACTIVE ACTIVITIES AND ADLS
PAIN_FUNCTIONAL_ASSESSMENT: PREVENTS OR INTERFERES SOME ACTIVE ACTIVITIES AND ADLS

## 2019-06-24 NOTE — PROGRESS NOTES
Rested in bed throughout shift. Pain medication given as ordered and requested (every four hours). Bathed per self. Had large bowel movement without medication intervention.

## 2019-06-24 NOTE — CONSULTS
Department of Cardiovascular & Thoracic Surgery   Consult Note        Reason for Consult:  Left leg DVT  Requesting Physician:  Dr Porsha Loera       Date of Consult: 06/24/19      History Obtained From:  patient    HISTORY OF PRESENT ILLNESS:    The patient is a 54 y.o. male who presents with .history of chronic back pain s/p spinal fusion/screws/rods placement 5-6 years ago at [facility] whose post-op course was c/b left proximal venous damage c/b DVT s/p stent placement and treated with 6 months of short term AC who presents with acute extensive LLE DVT.    He reports of injuring left knee 3 months ago when he had fallen after tripping on the side walk with immediate pain and swelling behind the left knee which resolved with time. However, in the past 3-4 months, he reported progressive swelling of the left leg from thigh down to the distal leg associated with pain along the back of his left knee and leg. Described 8/10 with improvement to 6/10 with opiates. Pain radiates regionally around the area. Denies other risk factors other than the ones described above. Upon further enquiry and review of available information, during the spine surgery at [facility], there was injury to the IVC requiring repair of the IVC and transection of he common iliac artery to get exposure to the IVC. The artery was the repaired also. Patient not aware of the details except for large amount of blood loss during the procedure. The pt has been having swelling of both legs L>R since that time. Present event started 3 months ago following an injury with severe swelling of the LLE. The swelling significantly resolved without him seeking medical attention. For the past few days he has more pain left leg. He has advised at [facility]  that veins will build collaterals is the eventual resolution and likely have chronic swelling of the feet.               Past Medical History:        Diagnosis Date    Seizure disorder ([address]     Controlled by medication    Seizures ([facility] [address]      Past Surgical History:        Procedure Laterality Date    BACK SURGERY  2013    16-18 vertebrae fusion-from T5-S2    BACK SURGERY  1986?  SHOULDER ARTHROPLASTY Left      Current Medications:   Current Facility-Administered Medications: polyethylene glycol (GLYCOLAX) packet 34 g, 34 g, Oral, Daily  bisacodyl (DULCOLAX) suppository 10 mg, 10 mg, Rectal, Once  heparin (porcine) injection 8,710 Units, 80 Units/kg, Intravenous, PRN  heparin (porcine) injection 4,350 Units, 40 Units/kg, Intravenous, PRN  heparin 25,000 units in dextrose 5% 250 mL infusion, 18 Units/kg/hr, Intravenous, Continuous  morphine sulfate (PF) injection 4 mg, 4 mg, Intravenous, Q30 Min PRN  celecoxib (CELEBREX) capsule 200 mg, 200 mg, Oral, BID  lisinopril (PRINIVIL;ZESTRIL) tablet 10 mg, 10 mg, Oral, Daily  methocarbamol (ROBAXIN) tablet 750 mg, 750 mg, Oral, 4x Daily  ondansetron (ZOFRAN-ODT) disintegrating tablet 4 mg, 4 mg, Oral, Q8H PRN  topiramate (TOPAMAX) tablet 50 mg, 50 mg, Oral, Daily  morphine (MS CONTIN) extended release tablet 15 mg, 15 mg, Oral, 2 times per day  ipratropium-albuterol (DUONEB) nebulizer solution 1 ampule, 1 ampule, Inhalation, Q4H PRN  morphine sulfate (PF) injection 4 mg, 4 mg, Intravenous, Q4H PRN  ondansetron (ZOFRAN) injection 4 mg, 4 mg, Intravenous, Q6H PRN  prochlorperazine (COMPAZINE) injection 10 mg, 10 mg, Intravenous, Q6H PRN  docusate sodium (COLACE) capsule 100 mg, 100 mg, Oral, BID  senna (SENOKOT) tablet 17.2 mg, 2 tablet, Oral, BID  polyethylene glycol (GLYCOLAX) packet 17 g, 17 g, Oral, TID PRN  0.9 % sodium chloride infusion, , Intravenous, Continuous  oxyCODONE-acetaminophen (PERCOCET) 5-325 MG per tablet 2 tablet, 2 tablet, Oral, 5x Daily  divalproex (DEPAKOTE ER) extended release tablet 1,000 mg, 1,000 mg, Oral, BID  Allergies:  Patient has no known allergies.     Social History:   Social History     Socioeconomic History    Marital status:      Spouse name: Not on file    Number of children: Not on file    Years of education: Not on file    Highest education level: Not on file   Occupational History    Not on file   Social Needs    Financial resource strain: Not on file    Food insecurity:     Worry: Not on file     Inability: Not on file    Transportation needs:     Medical: Not on file     Non-medical: Not on file   Tobacco Use    Smoking status: Former Smoker    Smokeless tobacco: Never Used   Substance and Sexual Activity    Alcohol use: No    Drug use: No    Sexual activity: Not on file   Lifestyle    Physical activity:     Days per week: Not on file     Minutes per session: Not on file    Stress: Not on file   Relationships    Social connections:     Talks on phone: Not on file     Gets together: Not on file     Attends [employer] service: Not on file     Active member of club or organization: Not on file     Attends meetings of clubs or organizations: Not on file     Relationship status: Not on file    Intimate partner violence:     Fear of current or ex partner: Not on file     Emotionally abused: Not on file     Physically abused: Not on file     Forced sexual activity: Not on file   Other Topics Concern    Not on file   Social History Narrative    Not on file     Family History:    Family History   Problem Relation Age of Onset    Cancer Other     Tuberculosis Other     Diabetes Other     Heart Disease Other     High Blood Pressure Other     Stroke Other        REVIEW OF SYSTEMS:  Active Ambulatory Problems     Diagnosis Date Noted    Lumbago     Ankle arthritis 02/04/2014    Orchitis 12/04/2015     Resolved Ambulatory Problems     Diagnosis Date Noted    No Resolved Ambulatory Problems     Past Medical History:   Diagnosis Date    Seizure disorder ([facility] [address]     Seizures ([facility] [address]      VITALS:  /73   Pulse 88   Temp 98.4 °F (36.9 °C) (Oral)   Resp 16   Ht 5' 11\" (1.803 m)   Wt 293 lb 14.4 oz (133.3 kg)   SpO2 97%   BMI 40.99 kg/m²   PHYSICAL EXAM:  Physical Exam    General - AAO x 3 moderately obese  Psych - Appropriate affect/speech. No agitation  Eyes - Eye lids intact. No scleral icterus  ENT - Lips wnl. External ear clear/dry/intact. No thyromegaly on inspection  Neuro - No gross peripheral or central neuro deficits on inspection  Heart - Sinus. RRR. S1 and S2 present. No elevated JVD appreciated  Lung - Adequate air entry b/l, No crackles/wheezes appreciated  GI - Abdominal scar. Soft. No guarding/rigidity. No hepatosplenomegaly/ascites. BS+   - No CVA/suprapubic tenderness or palpable bladder distension  Skin - LLE swelling of the entire leg leg with local pain   Both legs show edema L>R. Moves both feet and toes without difficulty. brian doppler pulses present. No ischemia noted.             DATA:  Art and venous doppler left leg    IMPRESSION  Active Hospital Problems    Diagnosis Date Noted    DVT of deep femoral vein, left ([facility]) [I82.412] 06/23/2019       S/p repair IVC  Active Ambulatory Problems     Diagnosis Date Noted    Lumbago     Ankle arthritis 02/04/2014    Orchitis 12/04/2015     Resolved Ambulatory Problems     Diagnosis Date Noted    No Resolved Ambulatory Problems     Past Medical History:   Diagnosis Date    [facility] [address]     Seizures ([facility])            RECOMMENDATIONS:    Pt is currently on IV heparin   extended d/w pt reg interventional options  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

## 2019-06-24 NOTE — PLAN OF CARE
Problem: Falls - Risk of:  Goal: Will remain free from falls  Description  Will remain free from falls  6/24/2019 0756 by Doreen Malcolm RN  Outcome: Ongoing  6/23/2019 1757 by Mare Degorot RN  Outcome: Ongoing  Goal: Absence of physical injury  Description  Absence of physical injury  6/24/2019 0756 by Doreen Malcolm RN  Outcome: Ongoing  6/23/2019 1757 by Mare Degroot RN  Outcome: Ongoing     Problem: Infection:  Goal: Will remain free from infection  Description  Will remain free from infection  6/24/2019 0756 by Doreen Malcolm RN  Outcome: Ongoing  6/23/2019 1757 by Mare Degroot RN  Outcome: Ongoing     Problem: Safety:  Goal: Free from accidental physical injury  Description  Free from accidental physical injury  6/24/2019 0756 by Doreen Malcolm RN  Outcome: Ongoing  6/23/2019 1757 by Mare Degroot RN  Outcome: Ongoing  Goal: Free from intentional harm  Description  Free from intentional harm  6/24/2019 0756 by Doreen Malcolm RN  Outcome: Ongoing  6/23/2019 1757 by Mare Degroot RN  Outcome: Ongoing     Problem: Daily Care:  Goal: Daily care needs are met  Description  Daily care needs are met  6/24/2019 0756 by Doreen Malcolm RN  Outcome: Ongoing  6/23/2019 1757 by Mare Degroot RN  Outcome: Ongoing     Problem: Pain:  Goal: Patient's pain/discomfort is manageable  Description  Patient's pain/discomfort is manageable  6/24/2019 0756 by Doreen Malcolm RN  Outcome: Ongoing  6/23/2019 1757 by Mare Degroot RN  Outcome: Ongoing

## 2019-06-24 NOTE — PROGRESS NOTES
packet 34 g  34 g Oral Daily Jeannette Rodriguez MD        bisacodyl (DULCOLAX) suppository 10 mg  10 mg Rectal Once Jeannette Rodriguez MD        heparin (porcine) injection 8,710 Units  80 Units/kg Intravenous PRN Jeannette Rodriguez MD        heparin (porcine) injection 4,350 Units  40 Units/kg Intravenous PRN Jeannette Rodriguez MD        heparin 25,000 units in dextrose 5% 250 mL infusion  18 Units/kg/hr Intravenous Continuous Jeannette Rodriguez MD 26.2 mL/hr at 06/24/19 0957 24.02 Units/kg/hr at 06/24/19 0957    morphine sulfate (PF) injection 4 mg  4 mg Intravenous Q30 Min PRN Jeannette Rodriguez MD        celecoxib (CELEBREX) capsule 200 mg  200 mg Oral BID Jeannette Rodriguez MD   200 mg at 06/24/19 0911    lisinopril (PRINIVIL;ZESTRIL) tablet 10 mg  10 mg Oral Daily Jeannette Rodriguez MD   10 mg at 06/24/19 0911    methocarbamol (ROBAXIN) tablet 750 mg  750 mg Oral 4x Daily Jeannette Rodriguez MD   750 mg at 06/24/19 0912    ondansetron (ZOFRAN-ODT) disintegrating tablet 4 mg  4 mg Oral Q8H PRN Jeannette Rodriguez MD        topiramate (TOPAMAX) tablet 50 mg  50 mg Oral Daily Jeannette Rodriguez MD   50 mg at 06/24/19 0911    morphine (MS CONTIN) extended release tablet 15 mg  15 mg Oral 2 times per day Jeannette Rodriguez MD   15 mg at 06/24/19 0911    ipratropium-albuterol (DUONEB) nebulizer solution 1 ampule  1 ampule Inhalation Q4H PRN Jeannette Rodriguez MD        morphine sulfate (PF) injection 4 mg  4 mg Intravenous Q4H PRN Jeannette Rodriguez MD   4 mg at 06/24/19 0812    ondansetron (ZOFRAN) injection 4 mg  4 mg Intravenous Q6H PRN Jeannette Rodriguez MD        prochlorperazine (COMPAZINE) injection 10 mg  10 mg Intravenous Q6H PRN Jeannette Rodriguez MD        docusate sodium (COLACE) capsule 100 mg  100 mg Oral BID Jeannette Rodriguez MD   100 mg at 06/24/19 0911    senna (SENOKOT) tablet 17.2 mg  2 tablet Oral BID Jeannette Rodriguez MD   17.2 mg at 06/24/19 0911    polyethylene glycol (GLYCOLAX) packet 17 g  17 g Oral TID PRN Jeannette Rodriguez MD        0.9 % sodium chloride infusion Intravenous Continuous Belen Shah MD 50 mL/hr at 06/24/19 0416      oxyCODONE-acetaminophen (PERCOCET) 5-325 MG per tablet 2 tablet  2 tablet Oral 5x Daily Belen Shah MD   2 tablet at 06/24/19 0911    divalproex (DEPAKOTE ER) extended release tablet 1,000 mg  1,000 mg Oral BID Belen Shah MD   1,000 mg at 06/24/19 4397         Allergies  No Known Allergies    REVIEW OF SYSTEMS     Within above limitations. 14 point review of systems reviewed. Pertinent positive or negative as per HPI or otherwise negative per 14 point systems review. Reviewed 6/24/2019 at 10:09 AM    PHYSICAL EXAM       Blood pressure 134/73, pulse 88, temperature 98.4 °F (36.9 °C), temperature source Oral, resp. rate 16, height 5' 11\" (1.803 m), weight 293 lb 14.4 oz (133.3 kg), SpO2 97 %. General - AAO x 3  Psych - Appropriate affect/speech. No agitation  Eyes - Eye lids intact. No scleral icterus  ENT - Lips wnl. External ear clear/dry/intact. No thyromegaly on inspection  Neuro - No gross peripheral or central neuro deficits on inspection  Heart - Sinus. RRR. S1 and S2 present. No elevated JVD appreciated  Lung - Adequate air entry b/l, No crackles/wheezes appreciated  GI - Abdominal scar. Soft. No guarding/rigidity. No hepatosplenomegaly/ascites.  BS+   - No CVA/suprapubic tenderness or palpable bladder distension  Skin -improvement of LLE swelling of the entire leg leg with local pain      LABS AND IMAGING   CBC  [unfilled]    Last 3 Hemoglobin  Lab Results   Component Value Date    HGB 11.8 06/24/2019    HGB 13.0 06/23/2019    HGB 14.8 12/07/2018     Last 3 WBC/ANC  Lab Results   Component Value Date    WBC 5.4 06/24/2019    WBC 7.9 06/23/2019    WBC 5.7 12/07/2018     No components found for: GRNLOCTYABS  Last 3 Platelets  No results found for: PLATELET  Chemistry  [unfilled]  [unfilled]  Lab Results   Component Value Date     12/05/2015     Coagulation Studies  Lab Results   Component Value Date    INR 1.07 06/24/2019     Liver Function Studies  Lab Results   Component Value Date    ALT 11 06/23/2019    AST 10 06/23/2019    ALKPHOS 71 06/23/2019       Recent Imaging    [address] Konrad Fitzpatrick #[id number] ([id number]) (54 y.o. M) (Adm: 06/23/19)    [facility] [place] [id number]         Imaging Results (last 7 days)          Procedure Component Value Ref Range Date/Time     XR ABDOMEN (KUB) (SINGLE AP VIEW) [[id number]] Collected: 06/24/19 0945     Order Status: Completed Updated: 06/24/19 0951     Narrative:       EXAMINATION:  ONE SUPINE XRAY VIEW(S) OF THE ABDOMEN    6/24/2019 9:27 am    COMPARISON:  None    HISTORY:  ORDERING SYSTEM PROVIDED HISTORY: s/p ivc repair  TECHNOLOGIST PROVIDED HISTORY:  Reason for exam:->s/p ivc repair  Ordering Physician Provided Reason for Exam: s/p ivc repair    FINDINGS:  Moderate to large volume of stool in the colon.  Multiple gas-filled  nondilated loops of small bowel. No evidence of free intraperitoneal air. Thoracolumbar spinal fusion.     Impression:       1. Multiple nondilated loops of gas-filled small bowel in a pattern  suggestive of an ileus. 2. Moderate to large volume of stool in the colon.     VL DUP LOWER EXTREMITY VENOUS LEFT [[id number]] Collected: 06/23/19 1520     Order Status: Completed Updated: 06/23/19 1528     Narrative:       EXAMINATION:  DUPLEX VENOUS ULTRASOUND OF THE LEFT LOWER EXTREMITY    6/23/2019 1:23 pm    TECHNIQUE:  Duplex ultrasound and Doppler images were obtained of the left lower  extremity.     COMPARISON:  Left lower extremity venous duplex ultrasound March 22, 2019    HISTORY:  ORDERING SYSTEM PROVIDED HISTORY: leg pain and swelling, ro dvt  TECHNOLOGIST PROVIDED HISTORY:  Reason for exam:->leg pain and swelling, ro dvt  Acuity: Acute  Type of Exam: Initial  Relevant Medical/Surgical History: fell x 3 mos ago, worsening pain and  discoloration, no anti coags h/o dvt    FINDINGS:  The visualized veins of the left lower extremity demonstrate  noncompressibility of the left common femoral vein, femoral vein, and  popliteal vein.  No significant flow identified within the mid to distal  femoral vein and popliteal vein compatible with a occlusive thrombus at the  sites.  The left anterior tibial vein appears patent.  The peroneal vein and  posterior tibial vein are not visualized.  The soft tissues are edematous.     Impression:       1. Deep venous thrombus involving the common femoral vein, femoral vein, and  popliteal vein on the left which is occlusive within the femoral and  popliteal veins. Findings were discussed with WILLIE Crawford at 3:25 pm on 6/23/2019.     VL DUP LOWER EXTREMITY ARTERIES LEFT [[id number]] Collected: 06/23/19 1518     Order Status: Completed Updated: 06/23/19 1523     Narrative:       EXAMINATION:  ARTERIAL DUPLEX ULTRASOUND OF THE  LOWER EXTREMITY  6/23/2019 1:23 pm    TECHNIQUE:  Duplex ultrasound and Doppler images were obtained of the lower extremity. COMPARISON:  None. HISTORY:  ORDERING SYSTEM PROVIDED HISTORY: left leg pain, history of PAD  TECHNOLOGIST PROVIDED HISTORY:  Reason for exam:->left leg pain, history of PAD  Acuity: Acute  Type of Exam: Initial  Relevant Medical/Surgical History: fell x 3 mos ago, worsening pain and  discoloration, no anti coags h/o dvt    FINDINGS:  Flow velocities were measured as follows:        Com. Fem.   96 cm/sec    Prof.            65 cm/sec    SFA Prox.     97 cm/sec    SFA Mid.      86 cm/sec    SFA Dist.      67 cm/sec    Pop.             73 cm/sec    PTA             nonvisualized    Peron.          Nonvisualized    LUIS EDUARDO             14 cm/sec    The Doppler derived arterial velocity waveform of the left common femoral  artery, superficial femoral artery, and popliteal artery are all triphasic. Left anterior tibial artery waveform appears dampened and monophasic.     Impression:       Normal waveforms through the popliteal artery.     Posterior tibial artery assist  - likely expectantn management         Chronic back pain s/p rods/screw/spinal fusion  - on large doses of opiates  - constipation on KUB.  Optimize colace/senna/supp/miralax     HTN  Seizure disorder      [address] [facility]  6/24/2019 at 10:09 AM

## 2019-06-25 ENCOUNTER — APPOINTMENT (OUTPATIENT)
Dept: CT IMAGING | Age: 49
DRG: 300 | End: 2019-06-25
Payer: MEDICARE

## 2019-06-25 LAB
ACTIVATED CLOTTING TIME, LOW RANGE: 188 SEC
ANION GAP SERPL CALCULATED.3IONS-SCNC: 11 MMOL/L (ref 4–16)
APTT: 69.7 SECONDS (ref 21.2–33)
APTT: 78.5 SECONDS (ref 21.2–33)
BASOPHILS ABSOLUTE: 0.1 K/CU MM
BASOPHILS RELATIVE PERCENT: 1.1 % (ref 0–1)
BUN BLDV-MCNC: 11 MG/DL (ref 6–23)
CALCIUM SERPL-MCNC: 8.2 MG/DL (ref 8.3–10.6)
CHLORIDE BLD-SCNC: 105 MMOL/L (ref 99–110)
CO2: 23 MMOL/L (ref 21–32)
CREAT SERPL-MCNC: 0.8 MG/DL (ref 0.9–1.3)
DIFFERENTIAL TYPE: ABNORMAL
EOSINOPHILS ABSOLUTE: 0.4 K/CU MM
EOSINOPHILS RELATIVE PERCENT: 6.9 % (ref 0–3)
GFR AFRICAN AMERICAN: >60 ML/MIN/1.73M2
GFR NON-AFRICAN AMERICAN: >60 ML/MIN/1.73M2
GLUCOSE BLD-MCNC: 112 MG/DL (ref 70–99)
HCT VFR BLD CALC: 39.5 % (ref 42–52)
HEMOGLOBIN: 11.4 GM/DL (ref 13.5–18)
IMMATURE NEUTROPHIL %: 0.4 % (ref 0–0.43)
INR BLD: 1.04 INDEX
LYMPHOCYTES ABSOLUTE: 1.9 K/CU MM
LYMPHOCYTES RELATIVE PERCENT: 34.4 % (ref 24–44)
MCH RBC QN AUTO: 31.2 PG (ref 27–31)
MCHC RBC AUTO-ENTMCNC: 28.9 % (ref 32–36)
MCV RBC AUTO: 108.2 FL (ref 78–100)
MONOCYTES ABSOLUTE: 0.5 K/CU MM
MONOCYTES RELATIVE PERCENT: 8.7 % (ref 0–4)
NUCLEATED RBC %: 0 %
PDW BLD-RTO: 12.5 % (ref 11.7–14.9)
PLATELET # BLD: 156 K/CU MM (ref 140–440)
PMV BLD AUTO: 9.6 FL (ref 7.5–11.1)
POTASSIUM SERPL-SCNC: 4.2 MMOL/L (ref 3.5–5.1)
PROTHROMBIN TIME: 11.8 SECONDS (ref 9.12–12.5)
RBC # BLD: 3.65 M/CU MM (ref 4.6–6.2)
SEGMENTED NEUTROPHILS ABSOLUTE COUNT: 2.6 K/CU MM
SEGMENTED NEUTROPHILS RELATIVE PERCENT: 48.5 % (ref 36–66)
SODIUM BLD-SCNC: 139 MMOL/L (ref 135–145)
TOTAL IMMATURE NEUTOROPHIL: 0.02 K/CU MM
TOTAL NUCLEATED RBC: 0 K/CU MM
WBC # BLD: 5.4 K/CU MM (ref 4–10.5)

## 2019-06-25 PROCEDURE — 2580000003 HC RX 258: Performed by: PHYSICIAN ASSISTANT

## 2019-06-25 PROCEDURE — 85610 PROTHROMBIN TIME: CPT

## 2019-06-25 PROCEDURE — C1894 INTRO/SHEATH, NON-LASER: HCPCS

## 2019-06-25 PROCEDURE — 6370000000 HC RX 637 (ALT 250 FOR IP): Performed by: INTERNAL MEDICINE

## 2019-06-25 PROCEDURE — 6360000004 HC RX CONTRAST MEDICATION

## 2019-06-25 PROCEDURE — 6360000002 HC RX W HCPCS: Performed by: INTERNAL MEDICINE

## 2019-06-25 PROCEDURE — 80048 BASIC METABOLIC PNL TOTAL CA: CPT

## 2019-06-25 PROCEDURE — 2709999900 HC NON-CHARGEABLE SUPPLY

## 2019-06-25 PROCEDURE — 6360000002 HC RX W HCPCS

## 2019-06-25 PROCEDURE — C1769 GUIDE WIRE: HCPCS

## 2019-06-25 PROCEDURE — 1200000000 HC SEMI PRIVATE

## 2019-06-25 PROCEDURE — 6360000004 HC RX CONTRAST MEDICATION: Performed by: PHYSICIAN ASSISTANT

## 2019-06-25 PROCEDURE — 74177 CT ABD & PELVIS W/CONTRAST: CPT

## 2019-06-25 PROCEDURE — 85730 THROMBOPLASTIN TIME PARTIAL: CPT

## 2019-06-25 PROCEDURE — 94761 N-INVAS EAR/PLS OXIMETRY MLT: CPT

## 2019-06-25 PROCEDURE — 6370000000 HC RX 637 (ALT 250 FOR IP): Performed by: SURGERY

## 2019-06-25 PROCEDURE — 2580000003 HC RX 258: Performed by: INTERNAL MEDICINE

## 2019-06-25 PROCEDURE — B41GYZZ FLUOROSCOPY OF LEFT LOWER EXTREMITY ARTERIES USING OTHER CONTRAST: ICD-10-PCS | Performed by: SURGERY

## 2019-06-25 PROCEDURE — 36415 COLL VENOUS BLD VENIPUNCTURE: CPT

## 2019-06-25 PROCEDURE — 85025 COMPLETE CBC W/AUTO DIFF WBC: CPT

## 2019-06-25 PROCEDURE — 85347 COAGULATION TIME ACTIVATED: CPT

## 2019-06-25 PROCEDURE — 6360000002 HC RX W HCPCS: Performed by: SURGERY

## 2019-06-25 PROCEDURE — 2500000003 HC RX 250 WO HCPCS

## 2019-06-25 RX ORDER — HEPARIN SODIUM 1000 [USP'U]/ML
80 INJECTION, SOLUTION INTRAVENOUS; SUBCUTANEOUS PRN
Status: DISCONTINUED | OUTPATIENT
Start: 2019-06-25 | End: 2019-06-26

## 2019-06-25 RX ORDER — 0.9 % SODIUM CHLORIDE 0.9 %
10 VIAL (ML) INJECTION
Status: COMPLETED | OUTPATIENT
Start: 2019-06-25 | End: 2019-06-25

## 2019-06-25 RX ADMIN — OXYCODONE HYDROCHLORIDE AND ACETAMINOPHEN 2 TABLET: 5; 325 TABLET ORAL at 22:40

## 2019-06-25 RX ADMIN — METHOCARBAMOL TABLETS 750 MG: 750 TABLET, COATED ORAL at 16:53

## 2019-06-25 RX ADMIN — OXYCODONE HYDROCHLORIDE AND ACETAMINOPHEN 2 TABLET: 5; 325 TABLET ORAL at 09:04

## 2019-06-25 RX ADMIN — LISINOPRIL 10 MG: 10 TABLET ORAL at 09:05

## 2019-06-25 RX ADMIN — SODIUM CHLORIDE: 9 INJECTION, SOLUTION INTRAVENOUS at 05:58

## 2019-06-25 RX ADMIN — HEPARIN SODIUM AND DEXTROSE 26 UNITS/KG/HR: 10000; 5 INJECTION INTRAVENOUS at 04:12

## 2019-06-25 RX ADMIN — MORPHINE SULFATE 4 MG: 4 INJECTION, SOLUTION INTRAMUSCULAR; INTRAVENOUS at 12:48

## 2019-06-25 RX ADMIN — MORPHINE SULFATE 4 MG: 4 INJECTION, SOLUTION INTRAMUSCULAR; INTRAVENOUS at 21:05

## 2019-06-25 RX ADMIN — DIVALPROEX SODIUM 1000 MG: 500 TABLET, EXTENDED RELEASE ORAL at 09:06

## 2019-06-25 RX ADMIN — METHOCARBAMOL TABLETS 750 MG: 750 TABLET, COATED ORAL at 22:40

## 2019-06-25 RX ADMIN — MORPHINE SULFATE 15 MG: 15 TABLET, EXTENDED RELEASE ORAL at 09:05

## 2019-06-25 RX ADMIN — SODIUM CHLORIDE 10 ML: 9 INJECTION, SOLUTION INTRAMUSCULAR; INTRAVENOUS; SUBCUTANEOUS at 10:53

## 2019-06-25 RX ADMIN — IOPAMIDOL 75 ML: 755 INJECTION, SOLUTION INTRAVENOUS at 10:53

## 2019-06-25 RX ADMIN — CELECOXIB 200 MG: 200 CAPSULE ORAL at 09:05

## 2019-06-25 RX ADMIN — METHOCARBAMOL TABLETS 750 MG: 750 TABLET, COATED ORAL at 12:48

## 2019-06-25 RX ADMIN — HEPARIN SODIUM AND DEXTROSE 25.99 UNITS/KG/HR: 10000; 5 INJECTION INTRAVENOUS at 13:18

## 2019-06-25 RX ADMIN — METHOCARBAMOL TABLETS 750 MG: 750 TABLET, COATED ORAL at 09:04

## 2019-06-25 RX ADMIN — MORPHINE SULFATE 4 MG: 4 INJECTION, SOLUTION INTRAMUSCULAR; INTRAVENOUS at 08:28

## 2019-06-25 RX ADMIN — MORPHINE SULFATE 15 MG: 15 TABLET, EXTENDED RELEASE ORAL at 22:40

## 2019-06-25 RX ADMIN — OXYCODONE HYDROCHLORIDE AND ACETAMINOPHEN 2 TABLET: 5; 325 TABLET ORAL at 12:48

## 2019-06-25 RX ADMIN — MORPHINE SULFATE 4 MG: 4 INJECTION, SOLUTION INTRAMUSCULAR; INTRAVENOUS at 04:28

## 2019-06-25 RX ADMIN — OXYCODONE HYDROCHLORIDE AND ACETAMINOPHEN 2 TABLET: 5; 325 TABLET ORAL at 16:53

## 2019-06-25 RX ADMIN — TOPIRAMATE 50 MG: 25 TABLET, FILM COATED ORAL at 09:05

## 2019-06-25 RX ADMIN — DIVALPROEX SODIUM 1000 MG: 500 TABLET, EXTENDED RELEASE ORAL at 22:40

## 2019-06-25 RX ADMIN — CELECOXIB 200 MG: 200 CAPSULE ORAL at 22:40

## 2019-06-25 RX ADMIN — MORPHINE SULFATE 4 MG: 4 INJECTION, SOLUTION INTRAMUSCULAR; INTRAVENOUS at 16:51

## 2019-06-25 ASSESSMENT — PAIN DESCRIPTION - FREQUENCY
FREQUENCY: CONTINUOUS

## 2019-06-25 ASSESSMENT — PAIN DESCRIPTION - LOCATION
LOCATION: BACK;LEG
LOCATION: BACK;LEG
LOCATION: LEG;BACK
LOCATION: LEG
LOCATION: BACK
LOCATION: BACK;LEG
LOCATION: LEG

## 2019-06-25 ASSESSMENT — PAIN - FUNCTIONAL ASSESSMENT
PAIN_FUNCTIONAL_ASSESSMENT: PREVENTS OR INTERFERES SOME ACTIVE ACTIVITIES AND ADLS

## 2019-06-25 ASSESSMENT — PAIN DESCRIPTION - PAIN TYPE
TYPE: ACUTE PAIN
TYPE: ACUTE PAIN;CHRONIC PAIN
TYPE: ACUTE PAIN
TYPE: ACUTE PAIN
TYPE: CHRONIC PAIN
TYPE: ACUTE PAIN;CHRONIC PAIN
TYPE: CHRONIC PAIN;ACUTE PAIN

## 2019-06-25 ASSESSMENT — PAIN SCALES - GENERAL
PAINLEVEL_OUTOF10: 7
PAINLEVEL_OUTOF10: 5
PAINLEVEL_OUTOF10: 7
PAINLEVEL_OUTOF10: 7

## 2019-06-25 ASSESSMENT — PAIN DESCRIPTION - ONSET
ONSET: ON-GOING
ONSET: ON-GOING
ONSET: PROGRESSIVE
ONSET: ON-GOING

## 2019-06-25 ASSESSMENT — PAIN DESCRIPTION - PROGRESSION
CLINICAL_PROGRESSION: NOT CHANGED

## 2019-06-25 ASSESSMENT — PAIN DESCRIPTION - ORIENTATION
ORIENTATION: LEFT
ORIENTATION: LOWER
ORIENTATION: LEFT
ORIENTATION: LEFT

## 2019-06-25 ASSESSMENT — PAIN DESCRIPTION - DESCRIPTORS
DESCRIPTORS: ACHING;THROBBING
DESCRIPTORS: ACHING;THROBBING
DESCRIPTORS: ACHING;CONSTANT;THROBBING
DESCRIPTORS: CONSTANT;ACHING
DESCRIPTORS: ACHING;THROBBING

## 2019-06-25 NOTE — PROGRESS NOTES
PATIENT NAME: Harshal Awan    TODAY'S DATE: 06/25/19    SUBJECTIVE:    Pt is feeling about the same. Continues to c/o L leg pain and cramping. OBJECTIVE:   VITALS:    Vitals:    06/25/19 1226   BP:    Pulse:    Resp:    Temp:    SpO2: 99%     INTAKE/OUTPUT:    Date 06/25/19 0000 - 06/25/19 2359   Shift 4860-4382 0233-3942 1702-8431 24 Hour Total   INTAKE   P.O. 120 300  420   I. V.(mL/kg/hr) 919.4(0.9)   919.4   Shift Total(mL/kg) 1039.4(7.8) 300(2.3)  1339.4(10)   OUTPUT   Urine(mL/kg/hr) 750(0.7) 125  875   Shift Total(mL/kg) 750(5.6) 125(0.9)  875(6.6)   Weight (kg) 133.3 133.3 133.3 133.3      Patient Vitals for the past 96 hrs (Last 3 readings):   Weight   06/24/19 0514 293 lb 14.4 oz (133.3 kg)   06/23/19 1437 240 lb (108.9 kg)       EXAM:  Blood pressure 121/68, pulse 80, temperature 97.7 °F (36.5 °C), temperature source Oral, resp. rate 14, height 5' 11\" (1.803 m), weight 293 lb 14.4 oz (133.3 kg), SpO2 99 %. General appearance: No apparent distress, appears stated age and cooperative. Skin: unremarkable  HEENT Normocephalic, atraumatic without obvious deformity. Neck: Supple, Trachea midline   Lungs: Good respiratory effort. Clear to auscultation, bilaterally   Heart: Regular rate/ rhythm   Abdomen: Soft, non-tender or non-distended   Extremities: 2+ edema warm well perfused L calf TTP  Neurologic: Alert, grossly intact  Mental status: normal affect      Data:  CBC:   Recent Labs     06/23/19  1324 06/24/19  0329 06/25/19  0230   WBC 7.9 5.4 5.4   HGB 13.0* 11.8* 11.4*   HCT 39.0* 36.0* 39.5*    149 156     BMP:    Recent Labs     06/23/19  1324 06/24/19  0329 06/25/19  0230    143 139   K 4.4 4.3 4.2    106 105   CO2 27 27 23   BUN 13 12 11   CREATININE 1.0 0.8* 0.8*   GLUCOSE 92 117* 112*     Hepatic:   Recent Labs     06/23/19  1324   AST 10*   ALT 11   BILITOT 0.6   ALKPHOS 71     Mag:    No results for input(s): MG in the last 72 hours.    Phos:   No results for input(s):

## 2019-06-25 NOTE — PROGRESS NOTES
Patient refused glycolax, colace and senna stating that he had a bowel movement yesterday and when his mom comes in today he will have another. States he is on his regular bowel schedule and does not wish to take medication regarding this.

## 2019-06-25 NOTE — PROGRESS NOTES
Patient did not wish to be disturbed and hematology was unable to see. Given such a circumstance, d/w heme for patient to follow up outpatient. Will cancel heme eval in the inpatient due to patient factor.     67 [facility], Internal Medicine  6/24/2019 at 8:18 PM

## 2019-06-25 NOTE — PLAN OF CARE
Problem: Falls - Risk of:  Goal: Will remain free from falls  Description  Will remain free from falls  6/25/2019 0738 by Sarbjit Blank RN  Outcome: Ongoing  6/25/2019 0231 by Norman Jordan RN  Outcome: Ongoing  Goal: Absence of physical injury  Description  Absence of physical injury  6/25/2019 0738 by Sarbjit Blank RN  Outcome: Ongoing  6/25/2019 0231 by Norman Jordan RN  Outcome: Ongoing     Problem: Infection:  Goal: Will remain free from infection  Description  Will remain free from infection  6/25/2019 0738 by Sarbjit Blank RN  Outcome: Ongoing  6/25/2019 0231 by Norman Jordan RN  Outcome: Ongoing     Problem: Safety:  Goal: Free from accidental physical injury  Description  Free from accidental physical injury  6/25/2019 0738 by Sarbjit Blank RN  Outcome: Ongoing  6/25/2019 0231 by Norman Jordan RN  Outcome: Ongoing  Goal: Free from intentional harm  Description  Free from intentional harm  6/25/2019 0738 by Sarbjit Blank RN  Outcome: Ongoing  6/25/2019 0231 by Norman Jodran RN  Outcome: Ongoing     Problem: Daily Care:  Goal: Daily care needs are met  Description  Daily care needs are met  6/25/2019 0738 by Sarbjit Blank RN  Outcome: Ongoing  6/25/2019 0231 by Norman Jordan RN  Outcome: Ongoing     Problem: Pain:  Goal: Patient's pain/discomfort is manageable  Description  Patient's pain/discomfort is manageable  6/25/2019 0738 by Sarbjit Blank RN  Outcome: Ongoing  6/25/2019 0231 by Norman Jordan RN  Outcome: Ongoing

## 2019-06-25 NOTE — PROGRESS NOTES
Progress Note (Hospitalist, Internal Medicine)  IDENTIFYING INFORMATION   PATIENT:  Claudette Kingston  MRN:  [id number]  ADMIT DATE: 6/23/2019  TIME OF EVALUATION: 6/25/2019 1:19 PM      HISTORY OF PRESENT ILLNESS   Claudette Kingston is a 54 y.o. male with a past medical history of chronic back pain s/p spinal fusion/screws/rods placement 5-6 years ago at [facility] whose post-op course was c/b left proximal venous damage c/b DVT s/p stent placement and treated with 6 months of short term AC who presents with acute extensive LLE DVT.    He reports of injuring left knee 3 months ago when he had fallen after tripping on the side walk with immediate pain and swelling behind the left knee which resolved with time. However, in the past 3-4 months, he reported progressive swelling of the left leg from thigh down to the distal leg associated with pain along the back of his left knee and leg. Described 8/10 with improvement to 6/10 with opiates. Pain radiates regionally around the area. Denies other risk factors other than the ones described above. SUBJECTIVE     Swelling slight better but still having local discomfort and pain. Adamantly wishes his leg to be intervened upon. MEDICATIONS   Medications Prior to Admission  Medications Prior to Admission: lisinopril (PRINIVIL;ZESTRIL) 10 MG tablet, Take 10 mg by mouth daily  ondansetron (ZOFRAN ODT) 4 MG disintegrating tablet, Take 1 tablet by mouth every 8 hours as needed for Nausea or Vomiting  topiramate (TOPAMAX) 50 MG tablet, Take 50 mg by mouth daily   methocarbamol (ROBAXIN) 750 MG tablet, Take 750 mg by mouth 4 times daily. divalproex (DEPAKOTE) 500 MG DR tablet, Take 500 mg by mouth 4 times daily. celecoxib (CELEBREX) 200 MG capsule, Take 200 mg by mouth 2 times daily.   oxyCODONE-acetaminophen (PERCOCET)  MG per tablet, Take 2 tablets by mouth 2 times daily     Current Medications  Current Facility-Administered Medications   Medication Dose Route Frequency Provider Last Rate Last Dose    polyethylene glycol (GLYCOLAX) packet 34 g  34 g Oral Daily Purnima Olson MD        bisacodyl (DULCOLAX) suppository 10 mg  10 mg Rectal Once Purnima Olson MD        heparin (porcine) injection 8,710 Units  80 Units/kg Intravenous PRN Purnima Olson MD        heparin (porcine) injection 4,350 Units  40 Units/kg Intravenous PRN Purnima Olson MD        heparin 25,000 units in dextrose 5% 250 mL infusion  18 Units/kg/hr Intravenous Continuous Purnima Olson MD 28.3 mL/hr at 06/25/19 0412 26 Units/kg/hr at 06/25/19 0412    morphine sulfate (PF) injection 4 mg  4 mg Intravenous Q30 Min PRN Purnima Olson MD        celecoxib (CELEBREX) capsule 200 mg  200 mg Oral BID Purnima Olson MD   200 mg at 06/25/19 0905    lisinopril (PRINIVIL;ZESTRIL) tablet 10 mg  10 mg Oral Daily Purnima Olson MD   10 mg at 06/25/19 0905    methocarbamol (ROBAXIN) tablet 750 mg  750 mg Oral 4x Daily Purnima Olson MD   750 mg at 06/25/19 1248    ondansetron (ZOFRAN-ODT) disintegrating tablet 4 mg  4 mg Oral Q8H PRN Purnima Olson MD        topiramate (TOPAMAX) tablet 50 mg  50 mg Oral Daily Purnima Olson MD   50 mg at 06/25/19 0905    morphine (MS CONTIN) extended release tablet 15 mg  15 mg Oral 2 times per day Purnima Olson MD   15 mg at 06/25/19 0905    ipratropium-albuterol (DUONEB) nebulizer solution 1 ampule  1 ampule Inhalation Q4H PRN Purnima Olson MD        morphine sulfate (PF) injection 4 mg  4 mg Intravenous Q4H PRN Purnima Olson MD   4 mg at 06/25/19 1248    ondansetron (ZOFRAN) injection 4 mg  4 mg Intravenous Q6H PRN Purnima Olson MD        prochlorperazine (COMPAZINE) injection 10 mg  10 mg Intravenous Q6H PRN Purnima Olson MD        docusate sodium (COLACE) capsule 100 mg  100 mg Oral BID Purnima Olson MD   100 mg at 06/24/19 0911    senna (SENOKOT) tablet 17.2 mg  2 tablet Oral BID Purnima Olson MD   17.2 mg at 06/24/19 0911    polyethylene glycol (GLYCOLAX) packet 17 g  17 g Oral 12/05/2015     Coagulation Studies  Lab Results   Component Value Date    INR 1.04 06/25/2019     Liver Function Studies  Lab Results   Component Value Date    ALT 11 06/23/2019    AST 10 06/23/2019    ALKPHOS 71 06/23/2019       Recent Imaging      [address] Konrad Fitzpatrick #[id number] ([id number]) (54 y.o. M) (Adm: 06/23/19)    Dub Snellen [id number]         Imaging Results (last 7 days)          Procedure Component Value Ref Range Date/Time     CT ABDOMEN PELVIS W IV CONTRAST Additional Contrast? None [[id number]] Collected: 06/25/19 1133     Order Status: Completed Updated: 06/25/19 1146     Narrative:       EXAMINATION:  CT OF THE ABDOMEN AND PELVIS WITH CONTRAST 6/25/2019 10:52 am    TECHNIQUE:  CT of the abdomen and pelvis was performed with the administration of  intravenous contrast. Multiplanar reformatted images are provided for review. Dose modulation, iterative reconstruction, and/or weight based adjustment of  the mA/kV was utilized to reduce the radiation dose to as low as reasonably  achievable. COMPARISON:  Duplex venous ultrasound 06/23/2019    HISTORY:  ORDERING SYSTEM PROVIDED HISTORY: chronic DVT - please use venous phase IV  contrast.  TECHNOLOGIST PROVIDED HISTORY:  Additional Contrast?->None  Ordering Physician Provided Reason for Exam: chronic DVT - please use venous  phase IV contrast.  Acuity: Acute  Type of Exam: Initial  Additional signs and symptoms: none  Relevant Medical/Surgical History: 75ml isovue 370/ gfr>60    FINDINGS:  Lower Chest: There is a solid 5 mm noncalcified pulmonary nodule within the  right middle lobe.  There is an adjacent solid 3 mm pulmonary nodule within  the right middle lobe.  The lung bases are otherwise clear. Venous structures: There is suboptimal contrast opacification of the veins. There is thrombus evident within the left external iliac vein extending into  the left femoral vein.  There is no right-sided venous thrombus evident.   There is no IVC thrombus evident. Figueroa Plump is no thrombus evident within the  portal veins, splenic vein or superior mesenteric vein. Organs: There is diffuse hepatic steatosis.  The spleen, pancreas,  gallbladder and adrenal glands are normal in appearance.  The kidneys are  normal in appearance. GI/Bowel: There are no abnormally dilated loops of large or small bowel  present. Figueroa Plump is no mesenteric inflammatory stranding present.  Appendix is  normal.    Pelvis: The urinary bladder is normal in appearance.  There is no free fluid  or pelvic mass identified. Peritoneum/Retroperitoneum: Multiple surgical clips are present within the  retroperitoneum. Figueroa Plump is no pathologically enlarged lymphadenopathy  identified. Bones/Soft Tissues: Extensive postsurgical changes status post thoracolumbar  fusion are noted.  No lytic or blastic osseous lesions are identified.     Impression:       1. Suboptimal exam due to suboptimal contrast opacification of the venous  structures.  There is positive thrombus within the left external iliac and  left common femoral veins. 2. No other venous thrombus evident. 3. Hepatic steatosis. 4. Two solid right middle lobe pulmonary nodules measuring up to 5 mm. Follow-up imaging is recommended as per the recommendations below. RECOMMENDATIONS:  Fleischner Society guidelines for follow-up and management of incidentally  detected pulmonary nodules:    Multiple Solid Nodules:    Nodule size less than 6 mm  In a low-risk patient, no routine follow-up. In a high-risk patient, optional CT at 12 months. - Low risk patients include individuals with minimal or absent history of  smoking and other known risk factors. - High risk patients include individuals with a history or smoking or known  risk factors. Radiology 2017 http://pubs. rsna.org/doi/full/10.1148/radiol. 1263717588     XR ABDOMEN (KUB) (SINGLE AP VIEW) [[id number]] Collected: 06/24/19 0945     Order Status: Completed Updated: 06/24/19 8205   Narrative:       EXAMINATION:  ONE SUPINE XRAY VIEW(S) OF THE ABDOMEN    6/24/2019 9:27 am    COMPARISON:  None    HISTORY:  ORDERING SYSTEM PROVIDED HISTORY: s/p ivc repair  TECHNOLOGIST PROVIDED HISTORY:  Reason for exam:->s/p ivc repair  Ordering Physician Provided Reason for Exam: s/p ivc repair    FINDINGS:  Moderate to large volume of stool in the colon.  Multiple gas-filled  nondilated loops of small bowel. No evidence of free intraperitoneal air. Thoracolumbar spinal fusion.     Impression:       1. Multiple nondilated loops of gas-filled small bowel in a pattern  suggestive of an ileus. 2. Moderate to large volume of stool in the colon.     VL DUP LOWER EXTREMITY VENOUS LEFT [[id number]] Collected: 06/23/19 1520     Order Status: Completed Updated: 06/23/19 1528     Narrative:       EXAMINATION:  DUPLEX VENOUS ULTRASOUND OF THE LEFT LOWER EXTREMITY    6/23/2019 1:23 pm    TECHNIQUE:  Duplex ultrasound and Doppler images were obtained of the left lower  extremity. COMPARISON:  Left lower extremity venous duplex ultrasound March 22, 2019    HISTORY:  ORDERING SYSTEM PROVIDED HISTORY: leg pain and swelling, ro dvt  TECHNOLOGIST PROVIDED HISTORY:  Reason for exam:->leg pain and swelling, ro dvt  Acuity: Acute  Type of Exam: Initial  Relevant Medical/Surgical History: fell x 3 mos ago, worsening pain and  discoloration, no anti coags h/o dvt    FINDINGS:  The visualized veins of the left lower extremity demonstrate  noncompressibility of the left common femoral vein, femoral vein, and  popliteal vein.  No significant flow identified within the mid to distal  femoral vein and popliteal vein compatible with a occlusive thrombus at the  sites.  The left anterior tibial vein appears patent.  The peroneal vein and  posterior tibial vein are not visualized.  The soft tissues are edematous.     Impression:       1.  Deep venous thrombus involving the common femoral vein, femoral vein, and  popliteal vein on the left which is occlusive within the femoral and  popliteal veins. Findings were discussed with WILLIE Barclay at 3:25 pm on 6/23/2019.     VL DUP LOWER EXTREMITY ARTERIES LEFT [[id number]] Collected: 06/23/19 1518     Order Status: Completed Updated: 06/23/19 1523     Narrative:       EXAMINATION:  ARTERIAL DUPLEX ULTRASOUND OF THE  LOWER EXTREMITY  6/23/2019 1:23 pm    TECHNIQUE:  Duplex ultrasound and Doppler images were obtained of the lower extremity. COMPARISON:  None. HISTORY:  ORDERING SYSTEM PROVIDED HISTORY: left leg pain, history of PAD  TECHNOLOGIST PROVIDED HISTORY:  Reason for exam:->left leg pain, history of PAD  Acuity: Acute  Type of Exam: Initial  Relevant Medical/Surgical History: fell x 3 mos ago, worsening pain and  discoloration, no anti coags h/o dvt    FINDINGS:  Flow velocities were measured as follows:        Com. Fem.   96 cm/sec    Prof.            65 cm/sec    SFA Prox.     97 cm/sec    SFA Mid.      86 cm/sec    SFA Dist.      67 cm/sec    Pop.             73 cm/sec    PTA             nonvisualized    Peron.          Nonvisualized    LUIS EDUARDO             14 cm/sec    The Doppler derived arterial velocity waveform of the left common femoral  artery, superficial femoral artery, and popliteal artery are all triphasic. Left anterior tibial artery waveform appears dampened and monophasic.     Impression:       Normal waveforms through the popliteal artery. Posterior tibial artery and peroneal artery are nonvisualized and presumed to  be occluded. Dampened monophasic waveform of the left anterior tibial artery suggesting  significantly diminished flow.     XR CHEST PORTABLE [[id number]] Collected: 06/23/19 1404     Order Status: Completed Updated: 06/23/19 1410     Narrative:       EXAMINATION:  ONE XRAY VIEW OF THE CHEST    6/23/2019 1:03 pm    COMPARISON:  None.     HISTORY:  ORDERING SYSTEM PROVIDED HISTORY: leg swelling  TECHNOLOGIST PROVIDED HISTORY:  Reason for exam:->leg

## 2019-06-26 VITALS
HEART RATE: 78 BPM | SYSTOLIC BLOOD PRESSURE: 124 MMHG | HEIGHT: 71 IN | RESPIRATION RATE: 16 BRPM | DIASTOLIC BLOOD PRESSURE: 81 MMHG | OXYGEN SATURATION: 98 % | TEMPERATURE: 97.8 F | BODY MASS INDEX: 41.15 KG/M2 | WEIGHT: 293.9 LBS

## 2019-06-26 LAB
ANION GAP SERPL CALCULATED.3IONS-SCNC: 8 MMOL/L (ref 4–16)
APTT: 69.1 SECONDS (ref 21.2–33)
BASOPHILS ABSOLUTE: 0 K/CU MM
BASOPHILS RELATIVE PERCENT: 0.6 % (ref 0–1)
BUN BLDV-MCNC: 9 MG/DL (ref 6–23)
CALCIUM SERPL-MCNC: 8.5 MG/DL (ref 8.3–10.6)
CHLORIDE BLD-SCNC: 106 MMOL/L (ref 99–110)
CO2: 28 MMOL/L (ref 21–32)
CREAT SERPL-MCNC: 0.9 MG/DL (ref 0.9–1.3)
DIFFERENTIAL TYPE: ABNORMAL
EOSINOPHILS ABSOLUTE: 0.3 K/CU MM
EOSINOPHILS RELATIVE PERCENT: 5.2 % (ref 0–3)
GFR AFRICAN AMERICAN: >60 ML/MIN/1.73M2
GFR NON-AFRICAN AMERICAN: >60 ML/MIN/1.73M2
GLUCOSE BLD-MCNC: 103 MG/DL (ref 70–99)
HCT VFR BLD CALC: 38.4 % (ref 42–52)
HEMOGLOBIN: 12.4 GM/DL (ref 13.5–18)
IMMATURE NEUTROPHIL %: 0.6 % (ref 0–0.43)
INR BLD: 1.03 INDEX
LYMPHOCYTES ABSOLUTE: 0.8 K/CU MM
LYMPHOCYTES RELATIVE PERCENT: 13.9 % (ref 24–44)
MCH RBC QN AUTO: 31.1 PG (ref 27–31)
MCHC RBC AUTO-ENTMCNC: 32.3 % (ref 32–36)
MCV RBC AUTO: 96.2 FL (ref 78–100)
MONOCYTES ABSOLUTE: 0.2 K/CU MM
MONOCYTES RELATIVE PERCENT: 4.3 % (ref 0–4)
NUCLEATED RBC %: 0 %
PDW BLD-RTO: 12.5 % (ref 11.7–14.9)
PLATELET # BLD: 191 K/CU MM (ref 140–440)
PMV BLD AUTO: 9.5 FL (ref 7.5–11.1)
POTASSIUM SERPL-SCNC: 4.3 MMOL/L (ref 3.5–5.1)
PROTHROMBIN TIME: 11.9 SECONDS (ref 9.12–12.5)
RBC # BLD: 3.99 M/CU MM (ref 4.6–6.2)
SEGMENTED NEUTROPHILS ABSOLUTE COUNT: 4.1 K/CU MM
SEGMENTED NEUTROPHILS RELATIVE PERCENT: 75.4 % (ref 36–66)
SODIUM BLD-SCNC: 142 MMOL/L (ref 135–145)
TOTAL IMMATURE NEUTOROPHIL: 0.03 K/CU MM
TOTAL NUCLEATED RBC: 0 K/CU MM
WBC # BLD: 5.4 K/CU MM (ref 4–10.5)

## 2019-06-26 PROCEDURE — 85610 PROTHROMBIN TIME: CPT

## 2019-06-26 PROCEDURE — 36415 COLL VENOUS BLD VENIPUNCTURE: CPT

## 2019-06-26 PROCEDURE — 75822 VEIN X-RAY ARMS/LEGS: CPT

## 2019-06-26 PROCEDURE — 85730 THROMBOPLASTIN TIME PARTIAL: CPT

## 2019-06-26 PROCEDURE — 80048 BASIC METABOLIC PNL TOTAL CA: CPT

## 2019-06-26 PROCEDURE — 6370000000 HC RX 637 (ALT 250 FOR IP): Performed by: INTERNAL MEDICINE

## 2019-06-26 PROCEDURE — 6360000002 HC RX W HCPCS: Performed by: SURGERY

## 2019-06-26 PROCEDURE — 85025 COMPLETE CBC W/AUTO DIFF WBC: CPT

## 2019-06-26 PROCEDURE — 6370000000 HC RX 637 (ALT 250 FOR IP): Performed by: SURGERY

## 2019-06-26 PROCEDURE — 36012 PLACE CATHETER IN VEIN: CPT

## 2019-06-26 PROCEDURE — 2580000003 HC RX 258: Performed by: SURGERY

## 2019-06-26 RX ORDER — DIVALPROEX SODIUM 500 MG/1
TABLET, DELAYED RELEASE ORAL
Qty: 90 TABLET | Refills: 3 | Status: SHIPPED
Start: 2019-06-26

## 2019-06-26 RX ORDER — OXYCODONE AND ACETAMINOPHEN 10; 325 MG/1; MG/1
1 TABLET ORAL
Qty: 30 TABLET | Refills: 0 | Status: SHIPPED
Start: 2019-06-26 | End: 2019-06-27

## 2019-06-26 RX ADMIN — OXYCODONE HYDROCHLORIDE AND ACETAMINOPHEN 2 TABLET: 5; 325 TABLET ORAL at 00:45

## 2019-06-26 RX ADMIN — LISINOPRIL 10 MG: 10 TABLET ORAL at 09:36

## 2019-06-26 RX ADMIN — MORPHINE SULFATE 4 MG: 4 INJECTION, SOLUTION INTRAMUSCULAR; INTRAVENOUS at 08:04

## 2019-06-26 RX ADMIN — MORPHINE SULFATE 4 MG: 4 INJECTION, SOLUTION INTRAMUSCULAR; INTRAVENOUS at 02:19

## 2019-06-26 RX ADMIN — METHOCARBAMOL TABLETS 750 MG: 750 TABLET, COATED ORAL at 09:35

## 2019-06-26 RX ADMIN — MORPHINE SULFATE 15 MG: 15 TABLET, EXTENDED RELEASE ORAL at 09:35

## 2019-06-26 RX ADMIN — SODIUM CHLORIDE: 9 INJECTION, SOLUTION INTRAVENOUS at 02:19

## 2019-06-26 RX ADMIN — METHOCARBAMOL TABLETS 750 MG: 750 TABLET, COATED ORAL at 12:06

## 2019-06-26 RX ADMIN — DIVALPROEX SODIUM 1000 MG: 500 TABLET, EXTENDED RELEASE ORAL at 09:38

## 2019-06-26 RX ADMIN — OXYCODONE HYDROCHLORIDE AND ACETAMINOPHEN 2 TABLET: 5; 325 TABLET ORAL at 09:36

## 2019-06-26 RX ADMIN — CELECOXIB 200 MG: 200 CAPSULE ORAL at 09:38

## 2019-06-26 RX ADMIN — HEPARIN SODIUM AND DEXTROSE 26 UNITS/KG/HR: 10000; 5 INJECTION INTRAVENOUS at 04:26

## 2019-06-26 RX ADMIN — RIVAROXABAN 15 MG: 15 TABLET, FILM COATED ORAL at 12:05

## 2019-06-26 RX ADMIN — OXYCODONE HYDROCHLORIDE AND ACETAMINOPHEN 2 TABLET: 5; 325 TABLET ORAL at 12:05

## 2019-06-26 RX ADMIN — TOPIRAMATE 50 MG: 25 TABLET, FILM COATED ORAL at 09:37

## 2019-06-26 ASSESSMENT — PAIN SCALES - GENERAL
PAINLEVEL_OUTOF10: 7
PAINLEVEL_OUTOF10: 7
PAINLEVEL_OUTOF10: 5
PAINLEVEL_OUTOF10: 6
PAINLEVEL_OUTOF10: 5
PAINLEVEL_OUTOF10: 5

## 2019-06-26 ASSESSMENT — PAIN DESCRIPTION - DESCRIPTORS
DESCRIPTORS: ACHING;THROBBING
DESCRIPTORS: ACHING;THROBBING

## 2019-06-26 ASSESSMENT — PAIN DESCRIPTION - ORIENTATION
ORIENTATION: LEFT
ORIENTATION: LEFT

## 2019-06-26 ASSESSMENT — PAIN DESCRIPTION - ONSET
ONSET: ON-GOING
ONSET: ON-GOING

## 2019-06-26 ASSESSMENT — PAIN DESCRIPTION - FREQUENCY
FREQUENCY: CONTINUOUS
FREQUENCY: CONTINUOUS

## 2019-06-26 ASSESSMENT — PAIN DESCRIPTION - PAIN TYPE
TYPE: ACUTE PAIN;CHRONIC PAIN
TYPE: ACUTE PAIN;CHRONIC PAIN

## 2019-06-26 ASSESSMENT — PAIN DESCRIPTION - LOCATION
LOCATION: BACK;LEG
LOCATION: BACK;LEG

## 2019-06-26 ASSESSMENT — PAIN DESCRIPTION - PROGRESSION
CLINICAL_PROGRESSION: NOT CHANGED
CLINICAL_PROGRESSION: NOT CHANGED

## 2019-06-26 NOTE — PROGRESS NOTES
Pt DC with all belongings. DC instructions reviewed. meds to bed completed. Follow up shalini for dr Apolinar Mei was made for  pt. Pt states understanding to make his own f/u apps ASAP with PCP and ileana.

## 2019-06-26 NOTE — PROGRESS NOTES
hours.   INR:   Recent Labs     06/24/19  0329 06/25/19  0230 06/26/19  0412   INR 1.07 1.04 1.03         ASSESSMENT AND PLAN:    Patient Active Problem List   Diagnosis    Lumbago    Ankle arthritis    Orchitis    DVT of deep femoral vein, left (HCC)       Venogram reveals ? ligated L iliac vein. Okay to transition to PO AC and DC today. Pt will need outpt follow up either with us or his vascular surgeon at [facility].      Asha Merritt PA-C

## 2019-06-26 NOTE — DISCHARGE SUMMARY
lb 14.4 oz (133.3 kg), SpO2 98 %. General - AAO x 3  Psych - Appropriate affect/speech. No agitation  Eyes - Eye lids intact. No scleral icterus  ENT - Lips wnl. External ear clear/dry/intact. No thyromegaly on inspection  Neuro - No gross peripheral or central neuro deficits on inspection  Heart - Sinus. RRR. S1 and S2 present. No elevated JVD appreciated  Lung - Adequate air entry b/l, No crackles/wheezes appreciated  GI -  Soft. No guarding/rigidity. No hepatosplenomegaly/ascites. BS+   - No CVA/suprapubic tenderness or palpable bladder distension  Skin - Intact. No rash/petechiae/ecchymosis. Warm extremities        Significant Diagnostic Studies:   CBC:   Recent Labs     06/24/19  0329 06/25/19  0230 06/26/19  0412   WBC 5.4 5.4 5.4   HGB 11.8* 11.4* 12.4*    156 191     WBC   Date/Time Value Ref Range Status   06/26/2019 04:12 AM 5.4 4.0 - 10.5 K/CU MM Final   06/25/2019 02:30 AM 5.4 4.0 - 10.5 K/CU MM Final   06/24/2019 03:29 AM 5.4 4.0 - 10.5 K/CU MM Final     Hemoglobin   Date/Time Value Ref Range Status   06/26/2019 04:12 AM 12.4 (L) 13.5 - 18.0 GM/DL Final   06/25/2019 02:30 AM 11.4 (L) 13.5 - 18.0 GM/DL Final   06/24/2019 03:29 AM 11.8 (L) 13.5 - 18.0 GM/DL Final     Platelets   Date/Time Value Ref Range Status   06/26/2019 04:12  140 - 440 K/CU MM Final   06/25/2019 02:30  140 - 440 K/CU MM Final   06/24/2019 03:29  140 - 440 K/CU MM Final    CMP:  Recent Labs     06/23/19  1324 06/24/19  0329 06/25/19  0230 06/26/19  0412    143 139 142   K 4.4 4.3 4.2 4.3    106 105 106   CO2 27 27 23 28   BUN 13 12 11 9   CREATININE 1.0 0.8* 0.8* 0.9   CALCIUM 8.9 8.4 8.2* 8.5   PROT 7.0  --   --   --    LABALBU 3.6  --   --   --    BILITOT 0.6  --   --   --    ALKPHOS 71  --   --   --    AST 10*  --   --   --    ALT 11  --   --   --      Troponin: No results for input(s): TROPONINI in the last 72 hours. BNP: No results for input(s): BNP in the last 72 hours.   Lipids: No results for input(s): CHOL, HDL in the last 72 hours. Invalid input(s): LDLCALCU  ABGs:No results for input(s): PH, VSM3FGD, PO2ART, BE, [id number], CO2CT, O2SAT, LABCARB in the last 72 hours. Invalid input(s): METHGBART    Glucose: No results for input(s): POCGLU in the last 72 hours. Magnesium: No results for input(s): MG in the last 72 hours. Phosphorus:No results for input(s): PHOS in the last 72 hours. INR:   Recent Labs     06/24/19  0329 06/25/19  0230 06/26/19  0412   INR 1.07 1.04 1.03         Patient Instructions:   Martha Kulkarni   Home Medication Instructions FERNANDO:[id number]    Printed on:06/26/19 5009   Medication Information                      celecoxib (CELEBREX) 200 MG capsule  Take 200 mg by mouth 2 times daily. divalproex (DEPAKOTE) 500 MG DR tablet  RESUME YOUR HOME DOSE             lisinopril (PRINIVIL;ZESTRIL) 10 MG tablet  Take 10 mg by mouth daily             methocarbamol (ROBAXIN) 750 MG tablet  Take 750 mg by mouth 4 times daily. ondansetron (ZOFRAN ODT) 4 MG disintegrating tablet  Take 1 tablet by mouth every 8 hours as needed for Nausea or Vomiting             oxyCODONE-acetaminophen (PERCOCET)  MG per tablet  Take 1 tablet by mouth 5 times daily for 1 day.  RESUME YOUR HOME DOSE             rivaroxaban (XARELTO STARTER PACK) 15 & 20 MG Starter Pack  15 mg twice daily with food for 21 days followed by 20 mg once daily with food             topiramate (TOPAMAX) 50 MG tablet  Take 50 mg by mouth daily                   Code Status: Full Code     Consults:   IP CONSULT TO CARDIOTHORACIC SURGERY  IP CONSULT TO HOSPITALIST    Diet: cardiac diet    Activity: activity as tolerated   Work:    Discharged Condition: fair    Prognosis: Fair    Disposition: home      Follow-up with     Follow-up With  Details  Why  Contact Sohan Springer, DO    very small incidental lung nodules, Surveillance CT in 12 months  [address] MD Briana  Schedule an appointment as soon as possible for a visit in 2 weeks  call to follow up with hematology in 2 weeks to establish care for blood clot. Also to get refill of xarelto for the next month  [address] [facility]  [phone]            Discharge Physician Signed:   Luis Carlos Mendez  [facility], Internal medicine  6/26/2019 at 10:57 AM    The patient was seen and examined on day of discharge and this discharge summary is in conjunction with any daily progress note from day of discharge.   Time spent on discharge in the examination, evaluation, counseling and review of medications and discharge plan: 33 minutes    Please forward this discharge summary to patient's PCP

## 2019-06-26 NOTE — PROGRESS NOTES
CLINICAL PHARMACY NOTE: MEDS TO [address] Select Patient?: No  Total # of Prescriptions Filled: 1   The following medications were delivered to the patient:  · Xarelto 15/20mg Starter Kit  Total # of Interventions Completed: 1  Time Spent (min): 15    Additional Documentation:   coupon used to cover this medicine today.

## 2019-06-27 LAB
EKG ATRIAL RATE: 93 BPM
EKG DIAGNOSIS: NORMAL
EKG P AXIS: 58 DEGREES
EKG P-R INTERVAL: 150 MS
EKG Q-T INTERVAL: 362 MS
EKG QRS DURATION: 90 MS
EKG QTC CALCULATION (BAZETT): 450 MS
EKG R AXIS: 22 DEGREES
EKG T AXIS: 6 DEGREES
EKG VENTRICULAR RATE: 93 BPM

## 2019-07-15 ENCOUNTER — HOSPITAL ENCOUNTER (OUTPATIENT)
Age: 49
Setting detail: SPECIMEN
Discharge: HOME | End: 2019-07-15
Payer: MEDICARE

## 2019-07-15 LAB
ALBUMIN SERPL-MCNC: 4.2 GM/DL (ref 3.4–5)
ALP BLD-CCNC: 77 IU/L (ref 40–128)
ALT SERPL-CCNC: 15 U/L (ref 10–40)
ANION GAP SERPL CALCULATED.3IONS-SCNC: 12 MMOL/L (ref 4–16)
AST SERPL-CCNC: 14 IU/L (ref 15–37)
BILIRUB SERPL-MCNC: 0.4 MG/DL (ref 0–1)
BUN BLDV-MCNC: 10 MG/DL (ref 6–23)
CALCIUM SERPL-MCNC: 8.8 MG/DL (ref 8.3–10.6)
CHLORIDE BLD-SCNC: 103 MMOL/L (ref 99–110)
CO2: 27 MMOL/L (ref 21–32)
CREAT SERPL-MCNC: 0.9 MG/DL (ref 0.9–1.3)
GFR AFRICAN AMERICAN: >60 ML/MIN/1.73M2
GFR NON-AFRICAN AMERICAN: >60 ML/MIN/1.73M2
GLUCOSE BLD-MCNC: 82 MG/DL (ref 70–99)
POTASSIUM SERPL-SCNC: 4.2 MMOL/L (ref 3.5–5.1)
SODIUM BLD-SCNC: 142 MMOL/L (ref 135–145)
TOTAL PROTEIN: 6.8 GM/DL (ref 6.4–8.2)

## 2019-07-15 PROCEDURE — 80053 COMPREHEN METABOLIC PANEL: CPT
